# Patient Record
Sex: MALE | Race: WHITE | NOT HISPANIC OR LATINO | Employment: PART TIME | URBAN - METROPOLITAN AREA
[De-identification: names, ages, dates, MRNs, and addresses within clinical notes are randomized per-mention and may not be internally consistent; named-entity substitution may affect disease eponyms.]

---

## 2023-05-26 LAB
ALBUMIN (G/DL) IN SER/PLAS: 4.4 G/DL (ref 3.4–5)
ANION GAP IN SER/PLAS: 11 MMOL/L (ref 10–20)
CALCIUM (MG/DL) IN SER/PLAS: 9.4 MG/DL (ref 8.6–10.3)
CARBON DIOXIDE, TOTAL (MMOL/L) IN SER/PLAS: 28 MMOL/L (ref 21–32)
CHLORIDE (MMOL/L) IN SER/PLAS: 106 MMOL/L (ref 98–107)
COBALAMIN (VITAMIN B12) (PG/ML) IN SER/PLAS: 413 PG/ML (ref 211–911)
CREATININE (MG/DL) IN SER/PLAS: 0.81 MG/DL (ref 0.5–1.3)
FOLATE (NG/ML) IN SER/PLAS: 16.3 NG/ML
GFR MALE: >90 ML/MIN/1.73M2
GLUCOSE (MG/DL) IN SER/PLAS: 92 MG/DL (ref 74–99)
PHOSPHATE (MG/DL) IN SER/PLAS: 2.9 MG/DL (ref 2.5–4.9)
POTASSIUM (MMOL/L) IN SER/PLAS: 4.1 MMOL/L (ref 3.5–5.3)
PROSTATE SPECIFIC AG (NG/ML) IN SER/PLAS: 1.72 NG/ML (ref 0–4)
SODIUM (MMOL/L) IN SER/PLAS: 141 MMOL/L (ref 136–145)
THYROTROPIN (MIU/L) IN SER/PLAS BY DETECTION LIMIT <= 0.05 MIU/L: 3.92 MIU/L (ref 0.44–3.98)
UREA NITROGEN (MG/DL) IN SER/PLAS: 9 MG/DL (ref 6–23)

## 2023-05-29 LAB — VITAMIN D 1,25-DIHYDROXY: 35.1 PG/ML (ref 19.9–79.3)

## 2023-08-25 ENCOUNTER — TELEPHONE (OUTPATIENT)
Dept: PRIMARY CARE | Facility: CLINIC | Age: 73
End: 2023-08-25

## 2023-09-14 ENCOUNTER — TELEPHONE (OUTPATIENT)
Dept: PHARMACY | Facility: HOSPITAL | Age: 73
End: 2023-09-14
Payer: MEDICARE

## 2023-09-14 NOTE — TELEPHONE ENCOUNTER
Population Health: Outreach by Ambulatory Pharmacy Team    Payor: [employer] [place]  Reason: Adherence  Medication: Rosuvastatin 40 mg   Outcome: Left Voicemail    Ying Esquivel, PharmD    09/14/23 at 3:15 PM - Ying Esquivel, PharmD

## 2023-10-02 PROBLEM — M62.08 RECTUS DIASTASIS: Status: ACTIVE | Noted: 2023-10-02

## 2023-10-02 PROBLEM — I65.01 OCCLUSION OF RIGHT VERTEBRAL ARTERY: Status: ACTIVE | Noted: 2023-10-02

## 2023-10-02 PROBLEM — M17.12 ARTHRITIS OF KNEE, LEFT: Status: ACTIVE | Noted: 2023-10-02

## 2023-10-02 PROBLEM — F10.11 HISTORY OF ALCOHOL ABUSE: Status: ACTIVE | Noted: 2023-10-02

## 2023-10-02 PROBLEM — M25.561 ARTHRALGIA OF RIGHT KNEE: Status: ACTIVE | Noted: 2023-10-02

## 2023-10-02 PROBLEM — L30.9 ECZEMA: Status: ACTIVE | Noted: 2023-10-02

## 2023-10-02 PROBLEM — S54.01XS: Status: ACTIVE | Noted: 2023-10-02

## 2023-10-02 PROBLEM — E03.9 HYPOTHYROID: Status: ACTIVE | Noted: 2023-10-02

## 2023-10-02 PROBLEM — I10 BENIGN ESSENTIAL HTN: Status: ACTIVE | Noted: 2023-10-02

## 2023-10-02 PROBLEM — R35.0 URINE FREQUENCY: Status: ACTIVE | Noted: 2023-10-02

## 2023-10-02 PROBLEM — E78.1 HYPERTRIGLYCERIDEMIA: Status: ACTIVE | Noted: 2023-10-02

## 2023-10-02 PROBLEM — Z91.148 NONCOMPLIANCE WITH MEDICATION REGIMEN: Status: ACTIVE | Noted: 2023-10-02

## 2023-10-02 PROBLEM — E78.5 DYSLIPIDEMIA: Status: ACTIVE | Noted: 2023-10-02

## 2023-10-02 PROBLEM — M25.511 CHRONIC RIGHT SHOULDER PAIN: Status: ACTIVE | Noted: 2023-10-02

## 2023-10-02 PROBLEM — I77.9 LEFT-SIDED CAROTID ARTERY DISEASE (CMS-HCC): Status: ACTIVE | Noted: 2023-10-02

## 2023-10-02 PROBLEM — N40.0 BPH (BENIGN PROSTATIC HYPERPLASIA): Status: ACTIVE | Noted: 2023-10-02

## 2023-10-02 PROBLEM — R53.83 FATIGUE: Status: ACTIVE | Noted: 2023-10-02

## 2023-10-02 PROBLEM — F41.9 ANXIETY: Status: ACTIVE | Noted: 2023-10-02

## 2023-10-02 PROBLEM — M75.41 SUBACROMIAL IMPINGEMENT OF RIGHT SHOULDER: Status: ACTIVE | Noted: 2023-10-02

## 2023-10-02 PROBLEM — R10.13 EPIGASTRIC PAIN: Status: ACTIVE | Noted: 2023-10-02

## 2023-10-02 PROBLEM — E63.9 POOR DIET: Status: ACTIVE | Noted: 2023-10-02

## 2023-10-02 PROBLEM — G89.29 CHRONIC RIGHT SHOULDER PAIN: Status: ACTIVE | Noted: 2023-10-02

## 2023-10-02 PROBLEM — M75.81 TENDINITIS OF RIGHT ROTATOR CUFF: Status: ACTIVE | Noted: 2023-10-02

## 2023-10-02 PROBLEM — I10 HYPERTENSION: Status: ACTIVE | Noted: 2023-10-02

## 2023-10-02 PROBLEM — K21.9 GERD (GASTROESOPHAGEAL REFLUX DISEASE): Status: ACTIVE | Noted: 2023-10-02

## 2023-10-02 RX ORDER — PREDNISONE 5 MG/1
3 TABLET ORAL DAILY
COMMUNITY
Start: 2023-05-18 | End: 2023-11-16 | Stop reason: WASHOUT

## 2023-10-02 RX ORDER — FLUTICASONE PROPIONATE 50 MCG
1 SPRAY, SUSPENSION (ML) NASAL NIGHTLY
COMMUNITY
Start: 2023-06-19 | End: 2023-10-12 | Stop reason: WASHOUT

## 2023-10-02 RX ORDER — ATORVASTATIN CALCIUM 40 MG/1
1 TABLET, FILM COATED ORAL DAILY
COMMUNITY
Start: 2020-09-04 | End: 2023-11-16 | Stop reason: WASHOUT

## 2023-10-02 RX ORDER — TRAMADOL HYDROCHLORIDE 50 MG/1
1-2 TABLET ORAL 4 TIMES DAILY PRN
COMMUNITY
End: 2023-10-12 | Stop reason: WASHOUT

## 2023-10-02 RX ORDER — LISINOPRIL 10 MG/1
1 TABLET ORAL DAILY
COMMUNITY
Start: 2022-12-30 | End: 2023-10-12 | Stop reason: WASHOUT

## 2023-10-02 RX ORDER — ROSUVASTATIN CALCIUM 40 MG/1
1 TABLET, COATED ORAL DAILY
COMMUNITY
Start: 2022-12-30 | End: 2024-01-02

## 2023-10-02 RX ORDER — PREGABALIN 75 MG/1
75 CAPSULE ORAL
COMMUNITY
Start: 2022-12-12 | End: 2023-10-12 | Stop reason: WASHOUT

## 2023-10-02 RX ORDER — DOXAZOSIN 2 MG/1
1 TABLET ORAL DAILY
COMMUNITY
Start: 2020-09-04 | End: 2023-10-12 | Stop reason: WASHOUT

## 2023-10-02 RX ORDER — PREDNISONE 10 MG/1
10 TABLET ORAL
COMMUNITY
End: 2023-11-16 | Stop reason: WASHOUT

## 2023-10-02 RX ORDER — LISINOPRIL 20 MG/1
1 TABLET ORAL DAILY
COMMUNITY
Start: 2023-05-26 | End: 2023-11-16 | Stop reason: SDUPTHER

## 2023-10-02 RX ORDER — OMEPRAZOLE 40 MG/1
1 CAPSULE, DELAYED RELEASE ORAL
COMMUNITY
Start: 2022-08-03 | End: 2023-10-12 | Stop reason: WASHOUT

## 2023-10-04 ENCOUNTER — OFFICE VISIT (OUTPATIENT)
Dept: OTOLARYNGOLOGY | Facility: CLINIC | Age: 73
End: 2023-10-04
Payer: MEDICARE

## 2023-10-04 VITALS
SYSTOLIC BLOOD PRESSURE: 202 MMHG | DIASTOLIC BLOOD PRESSURE: 93 MMHG | HEIGHT: 69 IN | TEMPERATURE: 98.3 F | WEIGHT: 175 LBS | BODY MASS INDEX: 25.92 KG/M2 | HEART RATE: 70 BPM

## 2023-10-04 DIAGNOSIS — H61.22 EXCESSIVE CERUMEN IN EAR CANAL, LEFT: ICD-10-CM

## 2023-10-04 DIAGNOSIS — H92.12 OTORRHEA OF LEFT EAR: ICD-10-CM

## 2023-10-04 DIAGNOSIS — H93.13 TINNITUS OF BOTH EARS: ICD-10-CM

## 2023-10-04 DIAGNOSIS — H93.8X2 BLOCKED EAR, LEFT: ICD-10-CM

## 2023-10-04 DIAGNOSIS — H60.392 OTHER INFECTIVE ACUTE OTITIS EXTERNA OF LEFT EAR: Primary | ICD-10-CM

## 2023-10-04 PROCEDURE — 1036F TOBACCO NON-USER: CPT | Performed by: NURSE PRACTITIONER

## 2023-10-04 PROCEDURE — 1159F MED LIST DOCD IN RCRD: CPT | Performed by: NURSE PRACTITIONER

## 2023-10-04 PROCEDURE — 3080F DIAST BP >= 90 MM HG: CPT | Performed by: NURSE PRACTITIONER

## 2023-10-04 PROCEDURE — 99204 OFFICE O/P NEW MOD 45 MIN: CPT | Performed by: NURSE PRACTITIONER

## 2023-10-04 PROCEDURE — 3077F SYST BP >= 140 MM HG: CPT | Performed by: NURSE PRACTITIONER

## 2023-10-04 PROCEDURE — 1125F AMNT PAIN NOTED PAIN PRSNT: CPT | Performed by: NURSE PRACTITIONER

## 2023-10-04 PROCEDURE — 99214 OFFICE O/P EST MOD 30 MIN: CPT | Performed by: NURSE PRACTITIONER

## 2023-10-04 RX ORDER — AMOXICILLIN AND CLAVULANATE POTASSIUM 875; 125 MG/1; MG/1
875 TABLET, FILM COATED ORAL 2 TIMES DAILY
Qty: 14 TABLET | Refills: 0 | Status: SHIPPED | OUTPATIENT
Start: 2023-10-04 | End: 2023-10-12 | Stop reason: WASHOUT

## 2023-10-04 RX ORDER — CIPROFLOXACIN AND DEXAMETHASONE 3; 1 MG/ML; MG/ML
4 SUSPENSION/ DROPS AURICULAR (OTIC) 2 TIMES DAILY
Qty: 2.8 ML | Refills: 0 | Status: SHIPPED | OUTPATIENT
Start: 2023-10-04 | End: 2023-10-11 | Stop reason: SDUPTHER

## 2023-10-04 ASSESSMENT — COLUMBIA-SUICIDE SEVERITY RATING SCALE - C-SSRS
2. HAVE YOU ACTUALLY HAD ANY THOUGHTS OF KILLING YOURSELF?: NO
1. IN THE PAST MONTH, HAVE YOU WISHED YOU WERE DEAD OR WISHED YOU COULD GO TO SLEEP AND NOT WAKE UP?: NO
6. HAVE YOU EVER DONE ANYTHING, STARTED TO DO ANYTHING, OR PREPARED TO DO ANYTHING TO END YOUR LIFE?: NO

## 2023-10-04 ASSESSMENT — ENCOUNTER SYMPTOMS
OCCASIONAL FEELINGS OF UNSTEADINESS: 0
DEPRESSION: 0
LOSS OF SENSATION IN FEET: 0

## 2023-10-04 ASSESSMENT — PATIENT HEALTH QUESTIONNAIRE - PHQ9
2. FEELING DOWN, DEPRESSED OR HOPELESS: NOT AT ALL
1. LITTLE INTEREST OR PLEASURE IN DOING THINGS: NOT AT ALL
SUM OF ALL RESPONSES TO PHQ9 QUESTIONS 1 AND 2: 0

## 2023-10-04 ASSESSMENT — PAIN SCALES - GENERAL: PAINLEVEL: 8

## 2023-10-04 NOTE — PROGRESS NOTES
Subjective   Patient ID: Sandro Florez is a 72 y.o. male who presents for Recurrent Otitis.  HPI  He has a history of left-sided ear aches. He is having pain and fullness. He feels his glands are swollen near the ear. He went to an Urgent Care a couple of months ago for this. He is having hearing loss from the ear. He tries flushing his ear himself. No ear drainage. He has bilateral tinnitus, worse on the left. No dizziness or vertigo. No previous ear surgery. No loud noise exposure. No family history of hearing loss.     Blood pressure is high. No headaches or lightheadedness.  Not currently taking his BP medications. Called pharmacy while here and he is picking it up to start after this.     Past Medical History:   Diagnosis Date    Abnormal findings on diagnostic imaging of other specified body structures     Abnormal carotid ultrasound    Other meniscus derangements, unspecified meniscus, unspecified knee 12/15/2020    Meniscus degeneration    Personal history of other diseases of the nervous system and sense organs 01/30/2015    History of impacted cerumen    Personal history of other infectious and parasitic diseases     History of measles    Personal history of other infectious and parasitic diseases     History of varicella    Personal history of other medical treatment     History of nuclear stress test    Unspecified abdominal hernia without obstruction or gangrene     Hernia      Past Surgical History:   Procedure Laterality Date    KNEE SURGERY  01/30/2015    Knee Surgery    OTHER SURGICAL HISTORY  01/30/2015    Arthroscopy Elbow Right    OTHER SURGICAL HISTORY  12/15/2020    Hydrocele repair    OTHER SURGICAL HISTORY  12/15/2020    Carpal tunnel surgery    OTHER SURGICAL HISTORY  12/15/2020    Extensor tendon repair      Review of Systems    Objective   Physical Exam  Constitutional: No fever, chills, weight loss or weight gain  General appearance: Appears well, well-nourished, well groomed. No acute  distress.    Communication: Normal communication    Psychiatric: Oriented to person, place and time. Normal mood and affect.    Neurologic: Cranial nerves II-XII grossly intact and symmetric bilaterally.    Head and Face:  Head: Atraumatic with no masses, lesions or scarring.  Face: Normal symmetry. No scars or deformities.  TMJ: Normal, no trismus.    Eyes: Conjunctiva not edematous or erythematous. PERRLA    Right Ear: External inspection of ear with no deformity, scars, or masses. EAC is clear.  TM is intact with no sign of infection, effusion, or retraction.  No perforation seen.     Left Ear: External inspection of ear with crusting at the meatus. Edema inferiorly with swollen lymph nodes. EAC is edematous and impacted with cerumen/debris that was partially removed using the microscope and suction.  Patient very tender. Not all debris able to be removed. .  TM is intact with no sign of infection, effusion, or retraction.  No perforation seen.     Nose: External inspection of nose: No nasal lesions, lacerations or scars. Anterior rhinoscopy with limited visualization past the inferior turbinates. No tenderness on frontal or maxillary sinus palpation.    Oral Cavity/Mouth: Oral cavity and oropharynx mucosa moist and pink. No lesions or masses. Dentition normal. Tonsils appear normal. Uvula is midline. Tongue with no masses or lesions. Tongue with good mobility. The oropharynx is clear.    Neck: Normal appearing, symmetric, trachea midline.     Cardiovascular: Examination of peripheral vascular system shows no clubbing or cyanosis.    Respiratory: No respiratory distress increased work of breathing. Inspection of the chest with symmetric chest expansion and normal respiratory effort.    Skin: No head and neck rashes.    Lymph nodes: No adenopathy.     Assessment/Plan       - Start oral Augmentin and Ciprodex drops. Use warm compress to the area. Follow dry ear precautions.  - Follow up in clinic in 1 week.  - If  symptoms worsen, call the office or proceed to the ED. If you develop headaches, dizziness or lightheadedness proceed to the ED as your BP was elevated today (patient states he will restart his medication tonight) and follow up with your PCP regarding this.    All questions answered to patient satisfaction.

## 2023-10-11 ENCOUNTER — TELEPHONE (OUTPATIENT)
Dept: CARDIOLOGY | Facility: CLINIC | Age: 73
End: 2023-10-11

## 2023-10-11 ENCOUNTER — OFFICE VISIT (OUTPATIENT)
Dept: OTOLARYNGOLOGY | Facility: CLINIC | Age: 73
End: 2023-10-11
Payer: MEDICARE

## 2023-10-11 VITALS
SYSTOLIC BLOOD PRESSURE: 203 MMHG | TEMPERATURE: 98.4 F | HEART RATE: 60 BPM | BODY MASS INDEX: 26.7 KG/M2 | WEIGHT: 180.25 LBS | HEIGHT: 69 IN | DIASTOLIC BLOOD PRESSURE: 85 MMHG

## 2023-10-11 DIAGNOSIS — H60.392 OTHER INFECTIVE ACUTE OTITIS EXTERNA OF LEFT EAR: Primary | ICD-10-CM

## 2023-10-11 PROCEDURE — 3079F DIAST BP 80-89 MM HG: CPT | Performed by: NURSE PRACTITIONER

## 2023-10-11 PROCEDURE — 1159F MED LIST DOCD IN RCRD: CPT | Performed by: NURSE PRACTITIONER

## 2023-10-11 PROCEDURE — 1036F TOBACCO NON-USER: CPT | Performed by: NURSE PRACTITIONER

## 2023-10-11 PROCEDURE — 1126F AMNT PAIN NOTED NONE PRSNT: CPT | Performed by: NURSE PRACTITIONER

## 2023-10-11 PROCEDURE — 99213 OFFICE O/P EST LOW 20 MIN: CPT | Performed by: NURSE PRACTITIONER

## 2023-10-11 PROCEDURE — 3077F SYST BP >= 140 MM HG: CPT | Performed by: NURSE PRACTITIONER

## 2023-10-11 RX ORDER — CIPROFLOXACIN AND DEXAMETHASONE 3; 1 MG/ML; MG/ML
4 SUSPENSION/ DROPS AURICULAR (OTIC) 2 TIMES DAILY
Qty: 2.8 ML | Refills: 0 | Status: SHIPPED | OUTPATIENT
Start: 2023-10-11 | End: 2023-10-12 | Stop reason: WASHOUT

## 2023-10-11 ASSESSMENT — PATIENT HEALTH QUESTIONNAIRE - PHQ9
SUM OF ALL RESPONSES TO PHQ9 QUESTIONS 1 AND 2: 0
2. FEELING DOWN, DEPRESSED OR HOPELESS: NOT AT ALL
1. LITTLE INTEREST OR PLEASURE IN DOING THINGS: NOT AT ALL

## 2023-10-11 ASSESSMENT — ENCOUNTER SYMPTOMS
DEPRESSION: 0
OCCASIONAL FEELINGS OF UNSTEADINESS: 0
LOSS OF SENSATION IN FEET: 0

## 2023-10-11 ASSESSMENT — COLUMBIA-SUICIDE SEVERITY RATING SCALE - C-SSRS
1. IN THE PAST MONTH, HAVE YOU WISHED YOU WERE DEAD OR WISHED YOU COULD GO TO SLEEP AND NOT WAKE UP?: NO
2. HAVE YOU ACTUALLY HAD ANY THOUGHTS OF KILLING YOURSELF?: NO
6. HAVE YOU EVER DONE ANYTHING, STARTED TO DO ANYTHING, OR PREPARED TO DO ANYTHING TO END YOUR LIFE?: NO

## 2023-10-11 ASSESSMENT — PAIN SCALES - GENERAL: PAINLEVEL: 0-NO PAIN

## 2023-10-11 NOTE — PROGRESS NOTES
Subjective   Patient ID: Sandro Florez is a 72 y.o. male who presents for Recurrent Otitis.    HPI  INITIAL VISIT 10/4/2023:  He has a history of left-sided ear aches. He is having pain and fullness. He feels his glands are swollen near the ear. He went to an Urgent Care a couple of months ago for this. He is having hearing loss from the ear. He tries flushing his ear himself. No ear drainage. He has bilateral tinnitus, worse on the left. No dizziness or vertigo. No previous ear surgery. No loud noise exposure. No family history of hearing loss.     Blood pressure is high. No headaches or lightheadedness.  Not currently taking his BP medications. Called pharmacy while here and he is picking it up to start after this.     10/11/2023: Patient following up for his left ear. Has been taking oral antibiotic and ear drops. No more pain. Still can't hear, it is clogged. Sometimes it will pop and he can hear. He is still having tinnitus. No drainage. No dizziness or headaches. Overall much better.     Past Medical History:   Diagnosis Date    Abnormal findings on diagnostic imaging of other specified body structures     Abnormal carotid ultrasound    Other meniscus derangements, unspecified meniscus, unspecified knee 12/15/2020    Meniscus degeneration    Personal history of other diseases of the nervous system and sense organs 01/30/2015    History of impacted cerumen    Personal history of other infectious and parasitic diseases     History of measles    Personal history of other infectious and parasitic diseases     History of varicella    Personal history of other medical treatment     History of nuclear stress test    Unspecified abdominal hernia without obstruction or gangrene     Hernia      Past Surgical History:   Procedure Laterality Date    KNEE SURGERY  01/30/2015    Knee Surgery    OTHER SURGICAL HISTORY  01/30/2015    Arthroscopy Elbow Right    OTHER SURGICAL HISTORY  12/15/2020    Hydrocele repair    OTHER  SURGICAL HISTORY  12/15/2020    Carpal tunnel surgery    OTHER SURGICAL HISTORY  12/15/2020    Extensor tendon repair      Review of Systems    Objective   Physical Exam    Right Ear: External inspection of ear with no deformity, scars, or masses. EAC is clear.  TM is intact with no sign of infection, effusion, or retraction.  No perforation seen.     Left Ear: External inspection of ear with mild crusting at the meatus. Mild edema inferiorly with swollen lymph nodes, much improved. EAC is slightly edematous and impacted with cerumen/debris that was completely removed using the microscope and suction.  No tenderness on manipulation/ TM is intact with no sign of infection, effusion, or retraction.  No perforation seen.     Assessment/Plan   - Continue Ciprodex drops for the next 5 days. I sent a refill to your pharmacy.   - Keep ear dry.  - He will follow up in 2 weeks.  - I will contact your cardiologist regarding your blood pressure, patient will also call today. If he develops headache, dizziness or blurry vision proceed to the ED.    All questions answered.

## 2023-10-12 ENCOUNTER — OFFICE VISIT (OUTPATIENT)
Dept: CARDIOLOGY | Facility: CLINIC | Age: 73
End: 2023-10-12
Payer: MEDICARE

## 2023-10-12 VITALS
BODY MASS INDEX: 26.29 KG/M2 | HEART RATE: 63 BPM | DIASTOLIC BLOOD PRESSURE: 88 MMHG | WEIGHT: 178 LBS | SYSTOLIC BLOOD PRESSURE: 182 MMHG | OXYGEN SATURATION: 97 %

## 2023-10-12 DIAGNOSIS — I10 UNCONTROLLED HYPERTENSION: Primary | ICD-10-CM

## 2023-10-12 DIAGNOSIS — I65.22 STENOSIS OF LEFT CAROTID ARTERY: ICD-10-CM

## 2023-10-12 PROCEDURE — 1036F TOBACCO NON-USER: CPT | Performed by: PHYSICIAN ASSISTANT

## 2023-10-12 PROCEDURE — 3077F SYST BP >= 140 MM HG: CPT | Performed by: PHYSICIAN ASSISTANT

## 2023-10-12 PROCEDURE — 1159F MED LIST DOCD IN RCRD: CPT | Performed by: PHYSICIAN ASSISTANT

## 2023-10-12 PROCEDURE — 99214 OFFICE O/P EST MOD 30 MIN: CPT | Performed by: PHYSICIAN ASSISTANT

## 2023-10-12 PROCEDURE — 3079F DIAST BP 80-89 MM HG: CPT | Performed by: PHYSICIAN ASSISTANT

## 2023-10-12 PROCEDURE — 1126F AMNT PAIN NOTED NONE PRSNT: CPT | Performed by: PHYSICIAN ASSISTANT

## 2023-10-12 RX ORDER — DOXAZOSIN 2 MG/1
2 TABLET ORAL NIGHTLY
Qty: 30 TABLET | Refills: 11 | Status: SHIPPED | OUTPATIENT
Start: 2023-10-12 | End: 2023-12-19 | Stop reason: SDUPTHER

## 2023-10-12 NOTE — PROGRESS NOTES
Chief Complaint:   Uncontrolled HTN, carotid artery disease     History Of Present Illness:    Sandro Florez is a 73 y.o. male presenting with uncontrolled HTN.  Patient called our triage nurse line yesterday with reports of SBP readings >200 mmHg despite treatment with lisinopril 20mg every day.  At a previous visit with Dr. Fitzpatrick he was noted to be hypertensive, and as a result doxazosin was ordered - unfortunately patient never filled this medication.  He is asymptomatic despite markedly elevated SBP, denies dizziness, visual changes, headaches, epistaxis, chest pain.  Patient does have known LICA disease which was documented on carotid duplex U/S 1 year ago.  He was scheduled for repeat study more recently however due to complications with his work schedule this test was cancelled.  No TIA/CVA symptoms.  Patient is otherwise resting at his functional baseline.  Patient denies chest pain, chest pressure, palpitations, dyspnea on exertion, shortness of breath at rest, diaphoresis, nausea/vomiting, back pain, headache, lightheadedness, dizziness, syncope or presyncopal episodes, active bleeding signs or symptoms, excessive weight gain, muscle or joint pain, claudication.     Last Recorded Vitals:  Vitals:    10/12/23 1156   BP: (!) 182/88   BP Location: Left arm   Patient Position: Sitting   Pulse: 63   SpO2: 97%   Weight: 80.7 kg (178 lb)       Past Medical History:  He has a past medical history of Abnormal findings on diagnostic imaging of other specified body structures, Other meniscus derangements, unspecified meniscus, unspecified knee (12/15/2020), Personal history of other diseases of the nervous system and sense organs (01/30/2015), Personal history of other infectious and parasitic diseases, Personal history of other infectious and parasitic diseases, Personal history of other medical treatment, and Unspecified abdominal hernia without obstruction or gangrene.    Past Surgical History:  He has a past  surgical history that includes Knee surgery (01/30/2015); Other surgical history (01/30/2015); Other surgical history (12/15/2020); Other surgical history (12/15/2020); and Other surgical history (12/15/2020).      Social History:  He reports that he has quit smoking. His smoking use included cigarettes. He has never used smokeless tobacco. He reports current alcohol use of about 4.0 standard drinks of alcohol per week. He reports that he does not use drugs.    Family History:  Family History   Problem Relation Name Age of Onset    No Known Problems Mother      No Known Problems Father          Allergies:  Patient has no known allergies.    Outpatient Medications:  Current Outpatient Medications   Medication Instructions    atorvastatin (Lipitor) 40 mg tablet 1 tablet, oral, Daily    lisinopril 20 mg tablet 1 tablet, oral, Daily    predniSONE (Deltasone) 5 mg tablet 3 tablets, oral, Daily    predniSONE 10 mg, oral    rosuvastatin (Crestor) 40 mg tablet 1 tablet, oral, Daily       Physical Exam:  Constitutional: awake and alert, oriented ×3, no apparent distress  Skin: warm, dry, good turgor no obvious lesions  Eyes: pupils equal, round, reactive to light, conjunctiva pink and noninjected, no discharge  HENT: normocephalic and atraumatic, mucous membranes moist, trachea midline with no masses/goiter  Cardiovascular: S1/S2 regular, no murmur no rubs/gallops, no carotid bruits, no JVD  Pulmonary: symmetrical chest expansion, lungs are clear to auscultation bilaterally, no wheezes/rales/rhonchi, normal effort  Abdomen: nontender, nondistended, active bowel sounds, no ascites  Extremities: no cyanosis, clubbing, no LE edema no lesions; palpable pedal pulses  Neurologic: cranial nerves II - XII grossly intact, stable gait, no tremor       Last Labs:  CBC -  Lab Results   Component Value Date    WBC 4.7 08/12/2022    HGB 15.1 08/12/2022    HCT 43.7 08/12/2022    MCV 95 08/12/2022     08/12/2022       CMP -  Lab  Results   Component Value Date    CALCIUM 9.4 05/26/2023    PHOS 2.9 05/26/2023    PROT 6.9 08/12/2022    ALBUMIN 4.4 05/26/2023    AST 17 08/12/2022    ALT 11 08/12/2022    ALKPHOS 47 08/12/2022    BILITOT 0.9 08/12/2022       LIPID PANEL -   Lab Results   Component Value Date    CHOL 174 09/30/2022    TRIG 111 09/30/2022    HDL 51.3 09/30/2022    CHHDL 3.4 09/30/2022    LDLF 101 (H) 09/30/2022    VLDL 22 09/30/2022    NHDL 221 08/12/2022       RENAL FUNCTION PANEL -   Lab Results   Component Value Date    GLUCOSE 92 05/26/2023     05/26/2023    K 4.1 05/26/2023     05/26/2023    CO2 28 05/26/2023    ANIONGAP 11 05/26/2023    BUN 9 05/26/2023    CREATININE 0.81 05/26/2023    GFRMALE >90 05/26/2023    CALCIUM 9.4 05/26/2023    PHOS 2.9 05/26/2023    ALBUMIN 4.4 05/26/2023        Lab Results   Component Value Date    BNP 14 11/15/2018       Last Cardiology Tests:  ECG:  No results found for this or any previous visit from the past 1095 days.      Echo:  No results found for this or any previous visit from the past 1095 days.      Ejection Fractions:    Cath:  No results found for this or any previous visit from the past 1095 days.      Stress Test:  11/17/22  1. Normal stress myocardial perfusion imaging in response to  pharmacologic stress.  2. Well-maintained left ventricular function.  52 %    Cardiac Imaging:  10/27/22 - Carotid duplex U/S  Right Carotid: Findings are consistent with less than 50% stenosis of the right proximal ICA. Laminar flow seen by color Doppler. There are elevated velocities in the right ECA that are suggestive of disease. No evidence of hemodynamically significant stenosis of the right common carotid artery. The right vertebral artery demonstrates no flow. No evidence of hemodynamically significant stenosis in the right subclavian.  Left Carotid: Findings are consistent with greater than 70% stenosis of the left proximal ICA. Turbulent flow seen by color Doppler. Left external  carotid artery appears patent with no evidence of stenosis. No evidence of hemodynamically significant stenosis of the left common carotid artery. The left vertebral artery is patent with antegrade flow. No evidence of hemodynamically significant stenosis in the left subclavian.          Assessment/Plan   Diagnoses and all orders for this visit:  Uncontrolled hypertension  -Pressures remain markedly elevated with monotherapy, therefore add:  -     doxazosin (Cardura) 2 mg tablet; Take 1 tablet (2 mg) by mouth once daily at bedtime.  -Monitor home BP 2-3 days per week and notify our office with consistent SBP>160 mmHg  Stenosis of left carotid artery  -Known >70% LICA disease therefore repeat:  -     Vascular US carotid artery duplex bilateral; Future    F/U Dr. Fitzpatrick in 2-3 months    JAILENE WrightC

## 2023-10-12 NOTE — TELEPHONE ENCOUNTER
Pt called 10/11 stating his BP has been elevated (). He had a doctor's appt today and he was instructed to notify Dr Fitzpatrick. I reviewed with Dr Fitzpatrick and she would like him to see SWATI Mcgovern. Pt has an OV scheduled, but not until next month.     Pt was scheduled to see Froilan on 10/12, patient accepted and given appt details.

## 2023-11-08 ENCOUNTER — OFFICE VISIT (OUTPATIENT)
Dept: OTOLARYNGOLOGY | Facility: CLINIC | Age: 73
End: 2023-11-08
Payer: MEDICARE

## 2023-11-08 VITALS
TEMPERATURE: 99 F | RESPIRATION RATE: 16 BRPM | HEART RATE: 63 BPM | BODY MASS INDEX: 27.01 KG/M2 | HEIGHT: 69 IN | DIASTOLIC BLOOD PRESSURE: 82 MMHG | SYSTOLIC BLOOD PRESSURE: 199 MMHG | WEIGHT: 182.38 LBS

## 2023-11-08 DIAGNOSIS — H92.12 OTORRHEA OF LEFT EAR: ICD-10-CM

## 2023-11-08 DIAGNOSIS — H93.8X2 SENSATION OF PLUGGED EAR ON LEFT SIDE: ICD-10-CM

## 2023-11-08 DIAGNOSIS — H92.02 LEFT EAR PAIN: Primary | ICD-10-CM

## 2023-11-08 PROCEDURE — 87070 CULTURE OTHR SPECIMN AEROBIC: CPT | Performed by: NURSE PRACTITIONER

## 2023-11-08 PROCEDURE — 3077F SYST BP >= 140 MM HG: CPT | Performed by: NURSE PRACTITIONER

## 2023-11-08 PROCEDURE — 3079F DIAST BP 80-89 MM HG: CPT | Performed by: NURSE PRACTITIONER

## 2023-11-08 PROCEDURE — 87102 FUNGUS ISOLATION CULTURE: CPT | Performed by: NURSE PRACTITIONER

## 2023-11-08 PROCEDURE — 1036F TOBACCO NON-USER: CPT | Performed by: NURSE PRACTITIONER

## 2023-11-08 PROCEDURE — 99213 OFFICE O/P EST LOW 20 MIN: CPT | Performed by: NURSE PRACTITIONER

## 2023-11-08 PROCEDURE — 1159F MED LIST DOCD IN RCRD: CPT | Performed by: NURSE PRACTITIONER

## 2023-11-08 PROCEDURE — 1125F AMNT PAIN NOTED PAIN PRSNT: CPT | Performed by: NURSE PRACTITIONER

## 2023-11-08 RX ORDER — MELOXICAM 15 MG/1
15 TABLET ORAL DAILY
COMMUNITY
Start: 2023-11-02 | End: 2024-03-15 | Stop reason: ALTCHOICE

## 2023-11-08 RX ORDER — CLOTRIMAZOLE 1 G/ML
SOLUTION TOPICAL
Qty: 15 ML | Refills: 0 | Status: SHIPPED | OUTPATIENT
Start: 2023-11-08 | End: 2023-11-16 | Stop reason: WASHOUT

## 2023-11-08 RX ORDER — MUPIROCIN 20 MG/G
OINTMENT TOPICAL
Qty: 30 G | Refills: 0 | Status: SHIPPED | OUTPATIENT
Start: 2023-11-08 | End: 2023-11-16 | Stop reason: WASHOUT

## 2023-11-08 SDOH — ECONOMIC STABILITY: FOOD INSECURITY: WITHIN THE PAST 12 MONTHS, THE FOOD YOU BOUGHT JUST DIDN'T LAST AND YOU DIDN'T HAVE MONEY TO GET MORE.: NEVER TRUE

## 2023-11-08 SDOH — ECONOMIC STABILITY: HOUSING INSECURITY
IN THE LAST 12 MONTHS, WAS THERE A TIME WHEN YOU DID NOT HAVE A STEADY PLACE TO SLEEP OR SLEPT IN A SHELTER (INCLUDING NOW)?: NO

## 2023-11-08 SDOH — ECONOMIC STABILITY: INCOME INSECURITY: IN THE LAST 12 MONTHS, WAS THERE A TIME WHEN YOU WERE NOT ABLE TO PAY THE MORTGAGE OR RENT ON TIME?: NO

## 2023-11-08 ASSESSMENT — ENCOUNTER SYMPTOMS
DEPRESSION: 0
OCCASIONAL FEELINGS OF UNSTEADINESS: 0
LOSS OF SENSATION IN FEET: 0

## 2023-11-08 ASSESSMENT — PAIN SCALES - GENERAL: PAINLEVEL: 7

## 2023-11-08 ASSESSMENT — COLUMBIA-SUICIDE SEVERITY RATING SCALE - C-SSRS
6. HAVE YOU EVER DONE ANYTHING, STARTED TO DO ANYTHING, OR PREPARED TO DO ANYTHING TO END YOUR LIFE?: NO
1. IN THE PAST MONTH, HAVE YOU WISHED YOU WERE DEAD OR WISHED YOU COULD GO TO SLEEP AND NOT WAKE UP?: NO
2. HAVE YOU ACTUALLY HAD ANY THOUGHTS OF KILLING YOURSELF?: NO

## 2023-11-08 NOTE — PROGRESS NOTES
Subjective   Patient ID: Sandro Florez is a 72 y.o. male who presents for Recurrent Otitis.    HPI  INITIAL VISIT 10/4/2023:  He has a history of left-sided ear aches. He is having pain and fullness. He feels his glands are swollen near the ear. He went to an Urgent Care a couple of months ago for this. He is having hearing loss from the ear. He tries flushing his ear himself. No ear drainage. He has bilateral tinnitus, worse on the left. No dizziness or vertigo. No previous ear surgery. No loud noise exposure. No family history of hearing loss.     Blood pressure is high. No headaches or lightheadedness.  Not currently taking his BP medications. Called pharmacy while here and he is picking it up to start after this.     10/11/2023: Patient following up for his left ear. Has been taking oral antibiotic and ear drops. No more pain. Still can't hear, it is clogged. Sometimes it will pop and he can hear. He is still having tinnitus. No drainage. No dizziness or headaches. Overall much better.     11/8/2023: Patient following up for his left ear. Ear is clogged. He is having pain in the ear. He is having drainage from the ear.     Past Medical History:   Diagnosis Date    Abnormal findings on diagnostic imaging of other specified body structures     Abnormal carotid ultrasound    Other meniscus derangements, unspecified meniscus, unspecified knee 12/15/2020    Meniscus degeneration    Personal history of other diseases of the nervous system and sense organs 01/30/2015    History of impacted cerumen    Personal history of other infectious and parasitic diseases     History of measles    Personal history of other infectious and parasitic diseases     History of varicella    Personal history of other medical treatment     History of nuclear stress test    Unspecified abdominal hernia without obstruction or gangrene     Hernia      Past Surgical History:   Procedure Laterality Date    KNEE SURGERY  01/30/2015    Knee  Surgery    OTHER SURGICAL HISTORY  01/30/2015    Arthroscopy Elbow Right    OTHER SURGICAL HISTORY  12/15/2020    Hydrocele repair    OTHER SURGICAL HISTORY  12/15/2020    Carpal tunnel surgery    OTHER SURGICAL HISTORY  12/15/2020    Extensor tendon repair      Review of Systems    Objective   Physical Exam    Right Ear: External inspection of ear with no deformity, scars, or masses. EAC is clear.  TM is intact with no sign of infection, effusion, or retraction.  No perforation seen.     Left Ear: External inspection of ear with mild crusting at the meatus. Mild edema inferiorly with swollen lymph nodes. EAC is edematous and impacted with fungal appearing debris. More medially it appears like cerumen/dry drainage, but the TM appears intact but inflamed.  A culture was taken.     Assessment/Plan   - Start Clotrimazole drops for the next 14 days.  - Start Mupirocin ointment on the external ear for the next 7 days.  - Follow dry ear precautions.  - Patient will follow up next week.  Call the office with new or worsening symptoms.     All questions answered to patient satisfaction.

## 2023-11-09 ENCOUNTER — HOSPITAL ENCOUNTER (OUTPATIENT)
Dept: VASCULAR MEDICINE | Facility: CLINIC | Age: 73
Discharge: HOME | End: 2023-11-09
Payer: MEDICARE

## 2023-11-09 DIAGNOSIS — I65.23 OCCLUSION AND STENOSIS OF BILATERAL CAROTID ARTERIES: ICD-10-CM

## 2023-11-09 DIAGNOSIS — I65.22 STENOSIS OF LEFT CAROTID ARTERY: ICD-10-CM

## 2023-11-09 PROCEDURE — 93880 EXTRACRANIAL BILAT STUDY: CPT

## 2023-11-09 PROCEDURE — 93880 EXTRACRANIAL BILAT STUDY: CPT | Performed by: SURGERY

## 2023-11-14 LAB
FUNGUS SPEC CULT: NORMAL
FUNGUS SPEC FUNGUS STN: NORMAL

## 2023-11-15 PROBLEM — F41.1 GENERALIZED ANXIETY DISORDER: Status: ACTIVE | Noted: 2017-06-09

## 2023-11-15 PROBLEM — F33.1 MAJOR DEPRESSIVE DISORDER, RECURRENT EPISODE, MODERATE (MULTI): Status: ACTIVE | Noted: 2017-06-10

## 2023-11-16 ENCOUNTER — OFFICE VISIT (OUTPATIENT)
Dept: CARDIOLOGY | Facility: CLINIC | Age: 73
End: 2023-11-16
Payer: MEDICARE

## 2023-11-16 ENCOUNTER — OFFICE VISIT (OUTPATIENT)
Dept: OTOLARYNGOLOGY | Facility: CLINIC | Age: 73
End: 2023-11-16
Payer: MEDICARE

## 2023-11-16 VITALS
HEART RATE: 64 BPM | SYSTOLIC BLOOD PRESSURE: 156 MMHG | WEIGHT: 179.2 LBS | BODY MASS INDEX: 26.54 KG/M2 | HEIGHT: 69 IN | DIASTOLIC BLOOD PRESSURE: 88 MMHG | OXYGEN SATURATION: 96 %

## 2023-11-16 VITALS
HEART RATE: 60 BPM | HEIGHT: 69 IN | DIASTOLIC BLOOD PRESSURE: 88 MMHG | RESPIRATION RATE: 18 BRPM | SYSTOLIC BLOOD PRESSURE: 156 MMHG | OXYGEN SATURATION: 99 % | WEIGHT: 179 LBS | BODY MASS INDEX: 26.51 KG/M2

## 2023-11-16 DIAGNOSIS — H62.42 OTOMYCOSIS OF LEFT EAR: Primary | ICD-10-CM

## 2023-11-16 DIAGNOSIS — I65.22 STENOSIS OF LEFT CAROTID ARTERY: Primary | ICD-10-CM

## 2023-11-16 DIAGNOSIS — I10 BENIGN ESSENTIAL HTN: ICD-10-CM

## 2023-11-16 DIAGNOSIS — B36.9 OTOMYCOSIS OF LEFT EAR: Primary | ICD-10-CM

## 2023-11-16 PROCEDURE — 1125F AMNT PAIN NOTED PAIN PRSNT: CPT | Performed by: NURSE PRACTITIONER

## 2023-11-16 PROCEDURE — 3079F DIAST BP 80-89 MM HG: CPT | Performed by: NURSE PRACTITIONER

## 2023-11-16 PROCEDURE — 99212 OFFICE O/P EST SF 10 MIN: CPT | Performed by: NURSE PRACTITIONER

## 2023-11-16 PROCEDURE — 1159F MED LIST DOCD IN RCRD: CPT | Performed by: INTERNAL MEDICINE

## 2023-11-16 PROCEDURE — 3077F SYST BP >= 140 MM HG: CPT | Performed by: INTERNAL MEDICINE

## 2023-11-16 PROCEDURE — 1036F TOBACCO NON-USER: CPT | Performed by: INTERNAL MEDICINE

## 2023-11-16 PROCEDURE — 99214 OFFICE O/P EST MOD 30 MIN: CPT | Performed by: INTERNAL MEDICINE

## 2023-11-16 PROCEDURE — 1159F MED LIST DOCD IN RCRD: CPT | Performed by: NURSE PRACTITIONER

## 2023-11-16 PROCEDURE — 1125F AMNT PAIN NOTED PAIN PRSNT: CPT | Performed by: INTERNAL MEDICINE

## 2023-11-16 PROCEDURE — 1036F TOBACCO NON-USER: CPT | Performed by: NURSE PRACTITIONER

## 2023-11-16 PROCEDURE — 3077F SYST BP >= 140 MM HG: CPT | Performed by: NURSE PRACTITIONER

## 2023-11-16 PROCEDURE — 3079F DIAST BP 80-89 MM HG: CPT | Performed by: INTERNAL MEDICINE

## 2023-11-16 RX ORDER — LISINOPRIL 20 MG/1
40 TABLET ORAL DAILY
Qty: 180 TABLET | Refills: 3 | Status: SHIPPED | OUTPATIENT
Start: 2023-11-16 | End: 2024-02-22 | Stop reason: SDUPTHER

## 2023-11-16 RX ORDER — CLOTRIMAZOLE 1 G/ML
SOLUTION TOPICAL
Qty: 30 ML | Refills: 0 | Status: SHIPPED | OUTPATIENT
Start: 2023-11-16 | End: 2023-12-19 | Stop reason: ALTCHOICE

## 2023-11-16 RX ORDER — BENZONATATE 100 MG/1
CAPSULE ORAL
COMMUNITY
Start: 2023-11-09 | End: 2023-12-19 | Stop reason: ALTCHOICE

## 2023-11-16 NOTE — PROGRESS NOTES
PCP: none  Cardiologist: Milan Marie   Sandro Florez is a 73 y.o. male who is here for follow up of  carotid artery disease (asymptomatic) .  Since last visit, denies any new symptoms.  States he is tired all the time. Has OA and significant pain.     Review of Systems:  Otherwise, limited cardiovascular review of systems is negative.    Past Medical History:  He has a past medical history of Abnormal findings on diagnostic imaging of other specified body structures, Other meniscus derangements, unspecified meniscus, unspecified knee (12/15/2020), Personal history of other diseases of the nervous system and sense organs (01/30/2015), Personal history of other infectious and parasitic diseases, Personal history of other infectious and parasitic diseases, Personal history of other medical treatment, and Unspecified abdominal hernia without obstruction or gangrene.    Surgical History:   He has a past surgical history that includes Knee surgery (01/30/2015); Other surgical history (01/30/2015); Other surgical history (12/15/2020); Other surgical history (12/15/2020); and Other surgical history (12/15/2020).    Family History:   Family History   Problem Relation Name Age of Onset    No Known Problems Mother      No Known Problems Father       Family History   Problem Relation Name Age of Onset    No Known Problems Mother      No Known Problems Father         Social History:   Tobacco Use: Medium Risk (11/8/2023)    Patient History     Smoking Tobacco Use: Former     Smokeless Tobacco Use: Never     Passive Exposure: Not on file       Outpatient Medications:    Current Outpatient Medications:     doxazosin (Cardura) 2 mg tablet, Take 1 tablet (2 mg) by mouth once daily at bedtime., Disp: 30 tablet, Rfl: 11    lisinopril 20 mg tablet, Take 1 tablet (20 mg) by mouth once daily., Disp: , Rfl:     meloxicam (Mobic) 15 mg tablet, Take 1 tablet (15 mg) by mouth once daily., Disp: , Rfl:     rosuvastatin (Crestor) 40  "mg tablet, Take 1 tablet (40 mg) by mouth once daily., Disp: , Rfl:      Allergies:  Patient has no known allergies.       Objective   Vital Signs:  /88 (BP Location: Left arm, Patient Position: Sitting, BP Cuff Size: Adult)   Pulse 64   Ht 1.753 m (5' 9\")   Wt 81.3 kg (179 lb 3.2 oz)   SpO2 96%   BMI 26.46 kg/m²     Physical Exam:  General: no acute distress  HEENT: EOMI, no scleral icterus.  Lungs: Clear to auscultation bilaterally without wheezing, rales, or rhonchi.  Cardiovascular: Regular rhythm and rate. Normal S1 and S2. No murmurs, rubs, or gallops are appreciated. JVP normal.  left carotid bruit  Abdomen: Soft, nontender, nondistended. Bowel sounds present.  Extremities: Warm and well perfused with equal 2+ pulses bilaterally.  No edema present.  Neurologic: Alert and oriented x3.    Pertinent Recent Cardiovascular Studies (personally reviewed):  Vascular studies:  Carotid duplex 11/9/23: velocities slightly higher on L ICA compared to 2022.     Laboratory values:  CMP:  Recent Labs     05/26/23  0921 08/12/22  1054 11/15/18  1110 11/15/18  1056    142  --  132*   K 4.1 4.2  --  3.9    108*  --  103   CO2 28 26  --  27   ANIONGAP 11 12  --  6*   BUN 9 11  --  13   CREATININE 0.81 0.90  --  0.94   MG  --   --  2.13  --      Recent Labs     05/26/23  0921 08/12/22  1054 11/15/18  1056   ALBUMIN 4.4 4.3 4.5   ALKPHOS  --  47 60   ALT  --  11 24   AST  --  17 26   BILITOT  --  0.9 0.8     CBC:  Recent Labs     08/12/22  1054 11/15/18  1056   WBC 4.7 5.7   HGB 15.1 17.2   HCT 43.7 49.3    275   MCV 95 93     COAG:   Recent Labs     11/15/18  1110   INR 1.1     ABO: No results for input(s): \"ABO\" in the last 84289 hours.  HEME/ENDO:  Recent Labs     05/26/23  0921 08/12/22  1054 11/15/18  1110   TSH 3.92 3.26 5.68*      CARDIAC:   Recent Labs     11/15/18  1110   BNP 14     Recent Labs     09/30/22  0647 08/12/22  1054   CHOL 174 261*   LDLF 101* 166*   HDL 51.3 39.6*   TRIG 111 " "275*     MICRO: No results for input(s): \"ESR\", \"CRP\", \"PROCAL\" in the last 58124 hours.  Susceptibility data from last 90 days.  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      Assessment/Plan   73 y.o. male with asymptomatic carotid artery disease in the background of dyslipidemia and hypertension.    Significant BLE knee pain, hip pain. Management of arthritis with rheumatology. Generalized fatigue complaints.    Plan:  - will continue to monitor L carotid - remains asymptomatic currently  - reviewed sxs of stroke to look for  - ?statin intolerance - will trial 2 week off; our office will call him to see if improvement at 2 weeks  - increase lisinopril for goal BP <130/80 mmHg  - follow up 6 months endovascular clinic  - PCP referral         SIGNATURE: Marco Fitzpatrick MD PATIENT NAME: Sandro Florez   DATE/TIME: November 16, 2023 9:38 AM MRN: [id number]     "

## 2023-11-16 NOTE — PROGRESS NOTES
Subjective   Patient ID: Snadro Florez is a 72 y.o. male who presents for Recurrent Otitis.    HPI  INITIAL VISIT 10/4/2023:  He has a history of left-sided ear aches. He is having pain and fullness. He feels his glands are swollen near the ear. He went to an Urgent Care a couple of months ago for this. He is having hearing loss from the ear. He tries flushing his ear himself. No ear drainage. He has bilateral tinnitus, worse on the left. No dizziness or vertigo. No previous ear surgery. No loud noise exposure. No family history of hearing loss.     Blood pressure is high. No headaches or lightheadedness.  Not currently taking his BP medications. Called pharmacy while here and he is picking it up to start after this.     10/11/2023: Patient following up for his left ear. Has been taking oral antibiotic and ear drops. No more pain. Still can't hear, it is clogged. Sometimes it will pop and he can hear. He is still having tinnitus. No drainage. No dizziness or headaches. Overall much better.     11/8/2023: Patient following up for his left ear. Ear is clogged. He is having pain in the ear. He is having drainage from the ear.     11/16/2023: Patient following up for his right ear. It is improving, no pain. It is still itching and clogged. He has been using the Clotrimazole drops. States his hearing is at about 50%.     Past Medical History:   Diagnosis Date    Abnormal findings on diagnostic imaging of other specified body structures     Abnormal carotid ultrasound    Other meniscus derangements, unspecified meniscus, unspecified knee 12/15/2020    Meniscus degeneration    Personal history of other diseases of the nervous system and sense organs 01/30/2015    History of impacted cerumen    Personal history of other infectious and parasitic diseases     History of measles    Personal history of other infectious and parasitic diseases     History of varicella    Personal history of other medical treatment     History  of nuclear stress test    Unspecified abdominal hernia without obstruction or gangrene     Hernia      Past Surgical History:   Procedure Laterality Date    KNEE SURGERY  01/30/2015    Knee Surgery    OTHER SURGICAL HISTORY  01/30/2015    Arthroscopy Elbow Right    OTHER SURGICAL HISTORY  12/15/2020    Hydrocele repair    OTHER SURGICAL HISTORY  12/15/2020    Carpal tunnel surgery    OTHER SURGICAL HISTORY  12/15/2020    Extensor tendon repair      Review of Systems    Objective   Physical Exam    Right Ear: External inspection of ear with no deformity, scars, or masses. EAC is clear.  TM is intact with no sign of infection, effusion, or retraction.  No perforation seen.     Left Ear: External inspection of ear is normal. EAC is edematous and wet. Mild debris was removed using the microscope and suction. Much improved. The TM appears intact but inflamed.    Diagnostic Results     Culture shows Aspergillus fumigatus.     Assessment/Plan   - Continue Clotrimazole. I refilled these in case he runs out.   - Would like to see patient back next week, but he works next week so will follow up at the end of November. I advised him to call the office ASAP if symptoms begin to worsen.      All questions answered to patient satisfaction.

## 2023-11-17 LAB
BACTERIA SPEC CULT: ABNORMAL
BACTERIA SPEC CULT: ABNORMAL
GRAM STN SPEC: ABNORMAL
GRAM STN SPEC: ABNORMAL

## 2023-12-11 ENCOUNTER — OFFICE VISIT (OUTPATIENT)
Dept: ORTHOPEDIC SURGERY | Facility: CLINIC | Age: 73
End: 2023-12-11
Payer: MEDICARE

## 2023-12-11 DIAGNOSIS — M17.12 ARTHRITIS OF LEFT KNEE: Primary | ICD-10-CM

## 2023-12-11 DIAGNOSIS — Z01.818 PREOP GENERAL PHYSICAL EXAM: ICD-10-CM

## 2023-12-11 PROCEDURE — 1036F TOBACCO NON-USER: CPT | Performed by: ORTHOPAEDIC SURGERY

## 2023-12-11 PROCEDURE — 99213 OFFICE O/P EST LOW 20 MIN: CPT | Performed by: ORTHOPAEDIC SURGERY

## 2023-12-11 PROCEDURE — 1125F AMNT PAIN NOTED PAIN PRSNT: CPT | Performed by: ORTHOPAEDIC SURGERY

## 2023-12-11 PROCEDURE — 1160F RVW MEDS BY RX/DR IN RCRD: CPT | Performed by: ORTHOPAEDIC SURGERY

## 2023-12-11 PROCEDURE — 1159F MED LIST DOCD IN RCRD: CPT | Performed by: ORTHOPAEDIC SURGERY

## 2023-12-11 RX ORDER — SODIUM CHLORIDE, SODIUM LACTATE, POTASSIUM CHLORIDE, CALCIUM CHLORIDE 600; 310; 30; 20 MG/100ML; MG/100ML; MG/100ML; MG/100ML
100 INJECTION, SOLUTION INTRAVENOUS CONTINUOUS
OUTPATIENT
Start: 2024-03-25

## 2023-12-11 NOTE — PROGRESS NOTES
Subjective    Patient ID: Sandro Florez is a 73 y.o. male.    Chief Complaint: OTHER (F/U LT KNEE.)     Last Surgery: No surgery found  Last Surgery Date: No surgery found    HPI  Patient comes in follow-up for his left knee pain.  He had been treated conservatively for his left knee arthritis with Kenalog injections.  He states these have not provided any significant relief.  He now would like to discuss surgery.    Objective   Ortho Exam  Patient is in no acute distress.  He does walk with a mild antalgic gait favoring his left lower extremity.  Exam of his left knee reveals there is no warmth erythema.  He does have a moderate effusion.  He is tender more over the medial joint line and the lateral joint line.  Knee stable to varus and valgus stress testing.  Active range of motion is 0 to better than 124 degrees of flexion.    Image Results:  X-rays of his left knee were personally reviewed.  He is essentially bone-on-bone at the medial compartment.  He has moderate arthritic changes in the patellofemoral compartment.    Assessment/Plan   Encounter Diagnoses:  Arthritis of left knee    Preop general physical exam    Orders Placed This Encounter    Request for Pre-Admission Testing Visit    Enroll patient in total knee replacement care plan    Staphylococcus aureus/MRSA colonization, Culture    Urinalysis with Reflex Microscopic and Culture    CBC    Basic Metabolic Panel    Case Request Operating Room: Arthroplasty Total Knee     Patient has left knee pain secondary to arthritis.  He has tried and failed conservative management.  We then discussed surgical treatment options.  I discussed with him in detail the risk, benefits alternatives of a left total knee replacement.  The patient voiced understanding and informed consent was obtained.  The patient will call to schedule surgery.

## 2023-12-12 DIAGNOSIS — I10 BENIGN ESSENTIAL HTN: ICD-10-CM

## 2023-12-12 NOTE — PROGRESS NOTES
Per Dr Fitzpatrick, add amlodipine 5mg once daily to current regimen. Pt aware. Pended to Dr Fitzpatrick.

## 2023-12-15 RX ORDER — AMLODIPINE BESYLATE 5 MG/1
5 TABLET ORAL DAILY
Qty: 90 TABLET | Refills: 3 | Status: SHIPPED | OUTPATIENT
Start: 2023-12-15 | End: 2023-12-19 | Stop reason: SDUPTHER

## 2023-12-19 ENCOUNTER — OFFICE VISIT (OUTPATIENT)
Dept: PRIMARY CARE | Facility: CLINIC | Age: 73
End: 2023-12-19
Payer: MEDICARE

## 2023-12-19 ENCOUNTER — LAB (OUTPATIENT)
Dept: LAB | Facility: LAB | Age: 73
End: 2023-12-19
Payer: MEDICARE

## 2023-12-19 VITALS
WEIGHT: 178 LBS | OXYGEN SATURATION: 97 % | DIASTOLIC BLOOD PRESSURE: 92 MMHG | BODY MASS INDEX: 26.36 KG/M2 | HEART RATE: 80 BPM | HEIGHT: 69 IN | SYSTOLIC BLOOD PRESSURE: 174 MMHG

## 2023-12-19 DIAGNOSIS — I10 UNCONTROLLED HYPERTENSION: ICD-10-CM

## 2023-12-19 DIAGNOSIS — Z12.5 PROSTATE CANCER SCREENING: ICD-10-CM

## 2023-12-19 DIAGNOSIS — E78.5 HYPERLIPIDEMIA, UNSPECIFIED HYPERLIPIDEMIA TYPE: ICD-10-CM

## 2023-12-19 DIAGNOSIS — I10 BENIGN ESSENTIAL HTN: ICD-10-CM

## 2023-12-19 DIAGNOSIS — Z12.11 COLON CANCER SCREENING: ICD-10-CM

## 2023-12-19 DIAGNOSIS — E78.5 HYPERLIPIDEMIA, UNSPECIFIED HYPERLIPIDEMIA TYPE: Primary | ICD-10-CM

## 2023-12-19 DIAGNOSIS — Z00.00 HEALTH CARE MAINTENANCE: ICD-10-CM

## 2023-12-19 DIAGNOSIS — M17.12 ARTHRITIS OF LEFT KNEE: ICD-10-CM

## 2023-12-19 DIAGNOSIS — Z01.818 PREOP GENERAL PHYSICAL EXAM: ICD-10-CM

## 2023-12-19 DIAGNOSIS — Z91.148 NONCOMPLIANCE WITH MEDICATION REGIMEN: ICD-10-CM

## 2023-12-19 DIAGNOSIS — I65.22 STENOSIS OF LEFT CAROTID ARTERY: ICD-10-CM

## 2023-12-19 LAB
ANION GAP SERPL CALC-SCNC: 12 MMOL/L (ref 10–20)
BUN SERPL-MCNC: 14 MG/DL (ref 6–23)
CALCIUM SERPL-MCNC: 9.9 MG/DL (ref 8.6–10.6)
CHLORIDE SERPL-SCNC: 105 MMOL/L (ref 98–107)
CHOLEST SERPL-MCNC: 255 MG/DL (ref 0–199)
CHOLESTEROL/HDL RATIO: 6.2
CO2 SERPL-SCNC: 29 MMOL/L (ref 21–32)
CREAT SERPL-MCNC: 1 MG/DL (ref 0.5–1.3)
ERYTHROCYTE [DISTWIDTH] IN BLOOD BY AUTOMATED COUNT: 13.1 % (ref 11.5–14.5)
EST. AVERAGE GLUCOSE BLD GHB EST-MCNC: 100 MG/DL
GFR SERPL CREATININE-BSD FRML MDRD: 79 ML/MIN/1.73M*2
GLUCOSE SERPL-MCNC: 83 MG/DL (ref 74–99)
HBA1C MFR BLD: 5.1 %
HCT VFR BLD AUTO: 47.6 % (ref 41–52)
HDLC SERPL-MCNC: 41.4 MG/DL
HGB BLD-MCNC: 16.1 G/DL (ref 13.5–17.5)
LDLC SERPL CALC-MCNC: 156 MG/DL
MCH RBC QN AUTO: 31.4 PG (ref 26–34)
MCHC RBC AUTO-ENTMCNC: 33.8 G/DL (ref 32–36)
MCV RBC AUTO: 93 FL (ref 80–100)
NON HDL CHOLESTEROL: 214 MG/DL (ref 0–149)
NRBC BLD-RTO: 0 /100 WBCS (ref 0–0)
PLATELET # BLD AUTO: 293 X10*3/UL (ref 150–450)
POTASSIUM SERPL-SCNC: 4.6 MMOL/L (ref 3.5–5.3)
RBC # BLD AUTO: 5.12 X10*6/UL (ref 4.5–5.9)
SODIUM SERPL-SCNC: 141 MMOL/L (ref 136–145)
TRIGL SERPL-MCNC: 288 MG/DL (ref 0–149)
VLDL: 58 MG/DL (ref 0–40)
WBC # BLD AUTO: 5.7 X10*3/UL (ref 4.4–11.3)

## 2023-12-19 PROCEDURE — 1159F MED LIST DOCD IN RCRD: CPT | Performed by: STUDENT IN AN ORGANIZED HEALTH CARE EDUCATION/TRAINING PROGRAM

## 2023-12-19 PROCEDURE — 80048 BASIC METABOLIC PNL TOTAL CA: CPT

## 2023-12-19 PROCEDURE — 85027 COMPLETE CBC AUTOMATED: CPT

## 2023-12-19 PROCEDURE — G0103 PSA SCREENING: HCPCS

## 2023-12-19 PROCEDURE — 36415 COLL VENOUS BLD VENIPUNCTURE: CPT

## 2023-12-19 PROCEDURE — 99204 OFFICE O/P NEW MOD 45 MIN: CPT | Performed by: STUDENT IN AN ORGANIZED HEALTH CARE EDUCATION/TRAINING PROGRAM

## 2023-12-19 PROCEDURE — 1125F AMNT PAIN NOTED PAIN PRSNT: CPT | Performed by: STUDENT IN AN ORGANIZED HEALTH CARE EDUCATION/TRAINING PROGRAM

## 2023-12-19 PROCEDURE — 84154 ASSAY OF PSA FREE: CPT

## 2023-12-19 PROCEDURE — 3077F SYST BP >= 140 MM HG: CPT | Performed by: STUDENT IN AN ORGANIZED HEALTH CARE EDUCATION/TRAINING PROGRAM

## 2023-12-19 PROCEDURE — 1160F RVW MEDS BY RX/DR IN RCRD: CPT | Performed by: STUDENT IN AN ORGANIZED HEALTH CARE EDUCATION/TRAINING PROGRAM

## 2023-12-19 PROCEDURE — 83036 HEMOGLOBIN GLYCOSYLATED A1C: CPT

## 2023-12-19 PROCEDURE — 1036F TOBACCO NON-USER: CPT | Performed by: STUDENT IN AN ORGANIZED HEALTH CARE EDUCATION/TRAINING PROGRAM

## 2023-12-19 PROCEDURE — 3080F DIAST BP >= 90 MM HG: CPT | Performed by: STUDENT IN AN ORGANIZED HEALTH CARE EDUCATION/TRAINING PROGRAM

## 2023-12-19 PROCEDURE — 80061 LIPID PANEL: CPT

## 2023-12-19 RX ORDER — DOXAZOSIN 4 MG/1
4 TABLET ORAL NIGHTLY
Qty: 90 TABLET | Refills: 0 | Status: SHIPPED | OUTPATIENT
Start: 2023-12-19 | End: 2023-12-19 | Stop reason: SDUPTHER

## 2023-12-19 RX ORDER — AMLODIPINE BESYLATE 10 MG/1
10 TABLET ORAL DAILY
Qty: 90 TABLET | Refills: 0 | Status: SHIPPED | OUTPATIENT
Start: 2023-12-19 | End: 2023-12-19 | Stop reason: SDUPTHER

## 2023-12-19 RX ORDER — DOXAZOSIN 4 MG/1
4 TABLET ORAL NIGHTLY
Qty: 90 TABLET | Refills: 0 | Status: SHIPPED | OUTPATIENT
Start: 2023-12-19 | End: 2024-02-22 | Stop reason: WASHOUT

## 2023-12-19 RX ORDER — AMLODIPINE BESYLATE 10 MG/1
10 TABLET ORAL DAILY
Qty: 90 TABLET | Refills: 0 | Status: SHIPPED | OUTPATIENT
Start: 2023-12-19 | End: 2024-02-22 | Stop reason: SDUPTHER

## 2023-12-19 ASSESSMENT — ENCOUNTER SYMPTOMS
NEUROLOGICAL NEGATIVE: 1
CARDIOVASCULAR NEGATIVE: 1
CONSTITUTIONAL NEGATIVE: 1
GASTROINTESTINAL NEGATIVE: 1
PSYCHIATRIC NEGATIVE: 1
RESPIRATORY NEGATIVE: 1
MUSCULOSKELETAL NEGATIVE: 1

## 2023-12-19 NOTE — PROGRESS NOTES
"New patient here to est  Surgery clearance for left knee replacement.    Subjective   Patient ID: Sandro Florez is a 73 y.o. male.    Patient is a 73-year-old male with hypertension, hyperlipidemia, carotid artery disease and stenosis of left carotid artery, as well as diffuse arthritis who presents for establish care and medical clearance.  Patient states that he follows with Navin Sloan and is planning for surgical fixation of his knee.  Likely will need hip in the future however is doing knee first.  States that he is tolerating his medications, blood pressure medications has been titrated however he states he has not started amlodipine.  Otherwise he can achieve greater than 4 METS without any difficulty.  States no additional issues or concerns.    Past medical history as above  Past surgical history orthopedic surgeries  Social history denies any alcohol drug or tobacco use  Family history noncontributory        Review of Systems   Constitutional: Negative.    HENT: Negative.     Respiratory: Negative.     Cardiovascular: Negative.    Gastrointestinal: Negative.    Musculoskeletal: Negative.    Skin: Negative.    Neurological: Negative.    Psychiatric/Behavioral: Negative.         Objective Visit Vitals  BP (!) 174/92   Pulse 80   Ht 1.753 m (5' 9\")   Wt 80.7 kg (178 lb)   SpO2 97%   BMI 26.29 kg/m²   Smoking Status Former   BSA 1.98 m²      Physical Exam  Constitutional:       General: He is not in acute distress.     Appearance: He is not ill-appearing.   Eyes:      Pupils: Pupils are equal, round, and reactive to light.   Cardiovascular:      Rate and Rhythm: Normal rate and regular rhythm.      Pulses: Normal pulses.      Heart sounds: No murmur heard.  Pulmonary:      Effort: No respiratory distress.      Breath sounds: No wheezing.   Abdominal:      General: Abdomen is flat. Bowel sounds are normal. There is no distension.   Musculoskeletal:      Right lower leg: No edema.      Left lower leg: No " edema.   Skin:     General: Skin is warm and dry.   Neurological:      Mental Status: He is alert. Mental status is at baseline.      Cranial Nerves: No cranial nerve deficit.      Motor: No weakness.   Psychiatric:         Mood and Affect: Mood normal.         Behavior: Behavior normal.         Assessment/Plan   Diagnoses and all orders for this visit:  Hyperlipidemia, unspecified hyperlipidemia type  -     Lipid panel; Future  Health care maintenance  -     Hemoglobin A1C; Future  Noncompliance with medication regimen  Stenosis of left carotid artery  Benign essential HTN  -     amLODIPine (Norvasc) 10 mg tablet; Take 1 tablet (10 mg) by mouth once daily.  Prostate cancer screening  -     PSA, total and free; Future  Colon cancer screening  Uncontrolled hypertension  -     doxazosin (Cardura) 4 mg tablet; Take 1 tablet (4 mg) by mouth once daily at bedtime.  Patient seen on establishing care    #Hypertension  Significantly elevated today.  Recommend continuation of lisinopril to uptitrate doxazosin as well as amlodipine, he will closely monitor recommend close follow-up in 2 weeks to further evaluate    #Osteoarthritis  Significant issue long-term, he is able to achieve greater than 4 METS, medications adjustment as above, would be medically clear once blood pressure better controlled advise close follow-up as above    #Hyperlipidemia  Continue statin    #Stenosis of carotid artery  Follows with cardiology, continue follow-up    #Healthcare maintenance  Routine labs today  Advise age-appropriate vaccinations  Depression screen negative  Check PSA, up-to-date on colonoscopy screening    Return to care in 2 weeks for blood pressure check or as needed

## 2023-12-21 LAB
PSA FREE MFR SERPL: 22 %
PSA FREE SERPL-MCNC: 0.6 NG/ML
PSA SERPL IA-MCNC: 2.7 NG/ML (ref 0–4)

## 2023-12-30 DIAGNOSIS — E78.5 HYPERLIPIDEMIA, UNSPECIFIED: ICD-10-CM

## 2024-01-02 RX ORDER — ROSUVASTATIN CALCIUM 40 MG/1
40 TABLET, COATED ORAL DAILY
Qty: 90 TABLET | Refills: 3 | Status: SHIPPED | OUTPATIENT
Start: 2024-01-02

## 2024-01-09 ENCOUNTER — OFFICE VISIT (OUTPATIENT)
Dept: PAIN MEDICINE | Facility: CLINIC | Age: 74
End: 2024-01-09
Payer: MEDICARE

## 2024-01-09 VITALS
HEIGHT: 69 IN | SYSTOLIC BLOOD PRESSURE: 187 MMHG | HEART RATE: 81 BPM | RESPIRATION RATE: 18 BRPM | TEMPERATURE: 98.8 F | DIASTOLIC BLOOD PRESSURE: 91 MMHG | BODY MASS INDEX: 25.92 KG/M2 | WEIGHT: 175 LBS

## 2024-01-09 DIAGNOSIS — M13.0 POLYARTHRITIS: Primary | ICD-10-CM

## 2024-01-09 DIAGNOSIS — M17.12 PRIMARY OSTEOARTHRITIS OF LEFT KNEE: ICD-10-CM

## 2024-01-09 PROCEDURE — 99214 OFFICE O/P EST MOD 30 MIN: CPT | Performed by: ANESTHESIOLOGY

## 2024-01-09 PROCEDURE — 3080F DIAST BP >= 90 MM HG: CPT | Performed by: ANESTHESIOLOGY

## 2024-01-09 PROCEDURE — 1159F MED LIST DOCD IN RCRD: CPT | Performed by: ANESTHESIOLOGY

## 2024-01-09 PROCEDURE — 1160F RVW MEDS BY RX/DR IN RCRD: CPT | Performed by: ANESTHESIOLOGY

## 2024-01-09 PROCEDURE — 99204 OFFICE O/P NEW MOD 45 MIN: CPT | Performed by: ANESTHESIOLOGY

## 2024-01-09 PROCEDURE — 1125F AMNT PAIN NOTED PAIN PRSNT: CPT | Performed by: ANESTHESIOLOGY

## 2024-01-09 PROCEDURE — 3077F SYST BP >= 140 MM HG: CPT | Performed by: ANESTHESIOLOGY

## 2024-01-09 PROCEDURE — 1036F TOBACCO NON-USER: CPT | Performed by: ANESTHESIOLOGY

## 2024-01-09 RX ORDER — DULOXETIN HYDROCHLORIDE 30 MG/1
CAPSULE, DELAYED RELEASE ORAL
Qty: 60 CAPSULE | Refills: 0 | Status: SHIPPED | OUTPATIENT
Start: 2024-01-09 | End: 2024-02-15

## 2024-01-09 ASSESSMENT — ENCOUNTER SYMPTOMS
ABDOMINAL PAIN: 0
DEPRESSION: 0
WEAKNESS: 0
ADENOPATHY: 0
EYE PAIN: 0
SHORTNESS OF BREATH: 1
ARTHRALGIAS: 1
LOSS OF SENSATION IN FEET: 0
FREQUENCY: 1
OCCASIONAL FEELINGS OF UNSTEADINESS: 1
FEVER: 0

## 2024-01-09 ASSESSMENT — PAIN SCALES - GENERAL
PAINLEVEL_OUTOF10: 8
PAINLEVEL: 8

## 2024-01-09 ASSESSMENT — LIFESTYLE VARIABLES: TOTAL SCORE: 2

## 2024-01-09 ASSESSMENT — PAIN DESCRIPTION - DESCRIPTORS: DESCRIPTORS: ACHING;BURNING;SORE

## 2024-01-09 ASSESSMENT — PAIN - FUNCTIONAL ASSESSMENT: PAIN_FUNCTIONAL_ASSESSMENT: 0-10

## 2024-01-09 ASSESSMENT — PATIENT HEALTH QUESTIONNAIRE - PHQ9
SUM OF ALL RESPONSES TO PHQ9 QUESTIONS 1 AND 2: 0
1. LITTLE INTEREST OR PLEASURE IN DOING THINGS: NOT AT ALL
2. FEELING DOWN, DEPRESSED OR HOPELESS: NOT AT ALL

## 2024-01-09 NOTE — PROGRESS NOTES
"Chief Complain    New Patient Visit (For severe aritis pain in b/l knees, hips, elbows, hands and wrist, and been going on a couple of years. Deny neck and back surgery. Have images on file. Had 2 surgeries on arms and on left knee.Currently take prednisone, diclofenac sodium and had cortione injections and it did not help.)    History Of Present Illness  Sandro Florez is a 73 y.o. male here for evaluation of bilateral hand, right elbow, right ankle, left knee and left hip pain.  The patient has been experiencing these symptoms for last 2 year(s). The patient describes the pain as sharp. The patient's current pain score is 7-8 on a scale from 0-10. The pain is worsened by walking and \"can not do anything\"  and is alleviated by  hot bath and resting . Since the start of the symptoms the pain has been worse.    The patient denies any fever, chills, weight loss, weakness, bladder/ bowel incontinence, history of cancer, history of IV drug abuse, recent trauma.  + Numbness in right ankle and hands    Rx Tried  Meloxicam  Tramadol  Lyrica    Currently on Diclofenac 75 mg    Tried multiple knee injection    Has not tried PT    Past Medical History  He has a past medical history of Abnormal findings on diagnostic imaging of other specified body structures, Other meniscus derangements, unspecified meniscus, unspecified knee (12/15/2020), Personal history of other diseases of the nervous system and sense organs (01/30/2015), Personal history of other infectious and parasitic diseases, Personal history of other infectious and parasitic diseases, Personal history of other medical treatment, and Unspecified abdominal hernia without obstruction or gangrene.    Surgical History  He has a past surgical history that includes Knee surgery (01/30/2015); Other surgical history (01/30/2015); Other surgical history (12/15/2020); Other surgical history (12/15/2020); and Other surgical history (12/15/2020).    Social History  He reports " that he has quit smoking. His smoking use included cigarettes. He has never used smokeless tobacco. He reports current alcohol use of about 4.0 standard drinks of alcohol per week. He reports that he does not use drugs.    Family History  Family History   Problem Relation Name Age of Onset    No Known Problems Mother      No Known Problems Father          Allergies  Patient has no known allergies.    Review of Systems  Review of Systems   Constitutional:  Negative for fever.   HENT:  Negative for ear pain.    Eyes:  Negative for pain.   Respiratory:  Positive for shortness of breath.         Recommended evaluation by PCP for SOB   Cardiovascular:  Negative for chest pain.   Gastrointestinal:  Negative for abdominal pain.   Endocrine: Negative for cold intolerance and heat intolerance.   Genitourinary:  Positive for frequency.   Musculoskeletal:  Positive for arthralgias.   Skin:  Negative for rash.   Allergic/Immunologic: Negative for food allergies.   Neurological:  Negative for weakness.   Hematological:  Negative for adenopathy.   Psychiatric/Behavioral:  Negative for suicidal ideas.         Physical Exam  Physical Exam  HENT:      Head: Normocephalic and atraumatic.      Right Ear: External ear normal.      Left Ear: External ear normal.      Nose: Nose normal.      Mouth/Throat:      Mouth: Mucous membranes are moist.   Eyes:      Extraocular Movements: Extraocular movements intact.   Cardiovascular:      Rate and Rhythm: Normal rate.   Pulmonary:      Effort: Pulmonary effort is normal.   Abdominal:      Palpations: Abdomen is soft.   Musculoskeletal:      Cervical back: Neck supple.   Skin:     General: Skin is warm.   Neurological:      Mental Status: He is alert and oriented to person, place, and time.   Psychiatric:         Mood and Affect: Mood normal.         Behavior: Behavior normal.           Last Recorded Vitals  Blood pressure (!) 187/91, pulse 81, temperature 37.1 °C (98.8 °F), resp. rate 18,  "height 1.753 m (5' 9\"), weight 79.4 kg (175 lb).      Reviewed Labs      Latest Reference Range & Units 12/19/23 12:07   GLUCOSE 74 - 99 mg/dL 83   SODIUM 136 - 145 mmol/L 141   POTASSIUM 3.5 - 5.3 mmol/L 4.6   CHLORIDE 98 - 107 mmol/L 105   Bicarbonate 21 - 32 mmol/L 29   Anion Gap 10 - 20 mmol/L 12   Blood Urea Nitrogen 6 - 23 mg/dL 14   Creatinine 0.50 - 1.30 mg/dL 1.00   EGFR >60 mL/min/1.73m*2 79   Calcium 8.6 - 10.6 mg/dL 9.9        Assessment/Plan   Encounter Diagnosis   Name Primary?    Polyarthritis Yes        Sandro Florez is a 73 y.o. male here for evaluation of  bilateral hand, right elbow, right ankle, left knee and left hip pain.  He has been experiencing the symptoms for last couple of years or so.  He has been evaluated by rheumatology was negative for any rheumatological condition, has been diagnosed with osteoarthritis.  Currently he is worst affected his left knee and left hip.  He has had several corticosteroid injection in the left knee without any significant benefit.  He has been evaluated by orthopedics planning on getting knee replacement done in February.  Currently he is working at a [employer] shop, also dealing with health issues related to his wife.  He has previously failed NSAIDs, tramadol and Lyrica.  I would recommend trial of physical therapy, psychology for cognitive behavioral therapy, would also trial him on Cymbalta.  Discussed cryoneurolysis procedure which may provide some pain relief for up to 4 months or so, he agrees to proceed with cryoneurolysis procedure if approved by insurance.  Prescription also provided for compounded ointment.    I spent 47 minutes in the professional and overall care of this patient.       Guillermo Ramirez MD  "

## 2024-01-09 NOTE — H&P (VIEW-ONLY)
"Chief Complain    New Patient Visit (For severe aritis pain in b/l knees, hips, elbows, hands and wrist, and been going on a couple of years. Deny neck and back surgery. Have images on file. Had 2 surgeries on arms and on left knee.Currently take prednisone, diclofenac sodium and had cortione injections and it did not help.)    History Of Present Illness  Sandro Florez is a 73 y.o. male here for evaluation of bilateral hand, right elbow, right ankle, left knee and left hip pain.  The patient has been experiencing these symptoms for last 2 year(s). The patient describes the pain as sharp. The patient's current pain score is 7-8 on a scale from 0-10. The pain is worsened by walking and \"can not do anything\"  and is alleviated by  hot bath and resting . Since the start of the symptoms the pain has been worse.    The patient denies any fever, chills, weight loss, weakness, bladder/ bowel incontinence, history of cancer, history of IV drug abuse, recent trauma.  + Numbness in right ankle and hands    Rx Tried  Meloxicam  Tramadol  Lyrica    Currently on Diclofenac 75 mg    Tried multiple knee injection    Has not tried PT    Past Medical History  He has a past medical history of Abnormal findings on diagnostic imaging of other specified body structures, Other meniscus derangements, unspecified meniscus, unspecified knee (12/15/2020), Personal history of other diseases of the nervous system and sense organs (01/30/2015), Personal history of other infectious and parasitic diseases, Personal history of other infectious and parasitic diseases, Personal history of other medical treatment, and Unspecified abdominal hernia without obstruction or gangrene.    Surgical History  He has a past surgical history that includes Knee surgery (01/30/2015); Other surgical history (01/30/2015); Other surgical history (12/15/2020); Other surgical history (12/15/2020); and Other surgical history (12/15/2020).    Social History  He reports " that he has quit smoking. His smoking use included cigarettes. He has never used smokeless tobacco. He reports current alcohol use of about 4.0 standard drinks of alcohol per week. He reports that he does not use drugs.    Family History  Family History   Problem Relation Name Age of Onset    No Known Problems Mother      No Known Problems Father          Allergies  Patient has no known allergies.    Review of Systems  Review of Systems   Constitutional:  Negative for fever.   HENT:  Negative for ear pain.    Eyes:  Negative for pain.   Respiratory:  Positive for shortness of breath.         Recommended evaluation by PCP for SOB   Cardiovascular:  Negative for chest pain.   Gastrointestinal:  Negative for abdominal pain.   Endocrine: Negative for cold intolerance and heat intolerance.   Genitourinary:  Positive for frequency.   Musculoskeletal:  Positive for arthralgias.   Skin:  Negative for rash.   Allergic/Immunologic: Negative for food allergies.   Neurological:  Negative for weakness.   Hematological:  Negative for adenopathy.   Psychiatric/Behavioral:  Negative for suicidal ideas.         Physical Exam  Physical Exam  HENT:      Head: Normocephalic and atraumatic.      Right Ear: External ear normal.      Left Ear: External ear normal.      Nose: Nose normal.      Mouth/Throat:      Mouth: Mucous membranes are moist.   Eyes:      Extraocular Movements: Extraocular movements intact.   Cardiovascular:      Rate and Rhythm: Normal rate.   Pulmonary:      Effort: Pulmonary effort is normal.   Abdominal:      Palpations: Abdomen is soft.   Musculoskeletal:      Cervical back: Neck supple.   Skin:     General: Skin is warm.   Neurological:      Mental Status: He is alert and oriented to person, place, and time.   Psychiatric:         Mood and Affect: Mood normal.         Behavior: Behavior normal.           Last Recorded Vitals  Blood pressure (!) 187/91, pulse 81, temperature 37.1 °C (98.8 °F), resp. rate 18,  "height 1.753 m (5' 9\"), weight 79.4 kg (175 lb).      Reviewed Labs      Latest Reference Range & Units 12/19/23 12:07   GLUCOSE 74 - 99 mg/dL 83   SODIUM 136 - 145 mmol/L 141   POTASSIUM 3.5 - 5.3 mmol/L 4.6   CHLORIDE 98 - 107 mmol/L 105   Bicarbonate 21 - 32 mmol/L 29   Anion Gap 10 - 20 mmol/L 12   Blood Urea Nitrogen 6 - 23 mg/dL 14   Creatinine 0.50 - 1.30 mg/dL 1.00   EGFR >60 mL/min/1.73m*2 79   Calcium 8.6 - 10.6 mg/dL 9.9        Assessment/Plan   Encounter Diagnosis   Name Primary?    Polyarthritis Yes        Sandro Florez is a 73 y.o. male here for evaluation of  bilateral hand, right elbow, right ankle, left knee and left hip pain.  He has been experiencing the symptoms for last couple of years or so.  He has been evaluated by rheumatology was negative for any rheumatological condition, has been diagnosed with osteoarthritis.  Currently he is worst affected his left knee and left hip.  He has had several corticosteroid injection in the left knee without any significant benefit.  He has been evaluated by orthopedics planning on getting knee replacement done in February.  Currently he is working at a [employer] shop, also dealing with health issues related to his wife.  He has previously failed NSAIDs, tramadol and Lyrica.  I would recommend trial of physical therapy, psychology for cognitive behavioral therapy, would also trial him on Cymbalta.  Discussed cryoneurolysis procedure which may provide some pain relief for up to 4 months or so, he agrees to proceed with cryoneurolysis procedure if approved by insurance.  Prescription also provided for compounded ointment.    I spent 47 minutes in the professional and overall care of this patient.       Guillermo Ramirez MD  "

## 2024-01-11 PROBLEM — H93.8X2 SENSATION OF PLUGGED EAR ON LEFT SIDE: Status: ACTIVE | Noted: 2024-01-11

## 2024-01-11 PROBLEM — H60.399 ACUTE INFECTIVE OTITIS EXTERNA: Status: ACTIVE | Noted: 2024-01-11

## 2024-01-11 PROBLEM — Z86.19 HISTORY OF VARICELLA: Status: ACTIVE | Noted: 2024-01-11

## 2024-01-11 PROBLEM — H92.02 LEFT EAR PAIN: Status: ACTIVE | Noted: 2024-01-11

## 2024-01-11 PROBLEM — R07.89 CHEST DISCOMFORT: Status: ACTIVE | Noted: 2022-11-17

## 2024-01-11 PROBLEM — H93.13 TINNITUS OF BOTH EARS: Status: ACTIVE | Noted: 2024-01-11

## 2024-01-11 PROBLEM — M25.569 KNEE PAIN: Status: ACTIVE | Noted: 2023-05-01

## 2024-01-11 PROBLEM — H92.12 OTORRHEA OF LEFT EAR: Status: ACTIVE | Noted: 2024-01-11

## 2024-01-11 PROBLEM — M25.512 PAIN OF LEFT SHOULDER REGION: Status: ACTIVE | Noted: 2024-01-11

## 2024-01-11 PROBLEM — M25.569 ARTHRALGIA OF KNEE: Status: ACTIVE | Noted: 2023-10-02

## 2024-01-11 PROBLEM — M23.309 DERANGEMENT OF MENISCUS: Status: ACTIVE | Noted: 2024-01-11

## 2024-01-11 PROBLEM — H61.20 EXCESSIVE CERUMEN IN EAR CANAL: Status: ACTIVE | Noted: 2024-01-11

## 2024-01-11 PROBLEM — M54.9 BACK PAIN: Status: ACTIVE | Noted: 2022-09-30

## 2024-01-11 PROBLEM — R09.89 CAROTID BRUIT: Status: ACTIVE | Noted: 2024-01-11

## 2024-01-11 PROBLEM — R69 DISORDER: Status: ACTIVE | Noted: 2024-01-11

## 2024-01-11 PROBLEM — H62.40 OTOMYCOSIS: Status: ACTIVE | Noted: 2024-01-11

## 2024-01-11 PROBLEM — Z86.19 HISTORY OF MEASLES: Status: ACTIVE | Noted: 2024-01-11

## 2024-01-11 PROBLEM — I65.29 STENOSIS OF CAROTID ARTERY: Status: ACTIVE | Noted: 2023-10-02

## 2024-01-11 PROBLEM — B36.9 OTOMYCOSIS: Status: ACTIVE | Noted: 2024-01-11

## 2024-01-11 PROBLEM — M79.601 PAIN OF RIGHT UPPER EXTREMITY: Status: ACTIVE | Noted: 2023-10-02

## 2024-01-24 ENCOUNTER — HOSPITAL ENCOUNTER (OUTPATIENT)
Dept: PAIN MEDICINE | Facility: CLINIC | Age: 74
Discharge: HOME | End: 2024-01-24
Payer: MEDICARE

## 2024-01-24 ENCOUNTER — HOSPITAL ENCOUNTER (OUTPATIENT)
Dept: RADIOLOGY | Facility: CLINIC | Age: 74
Discharge: HOME | End: 2024-01-24
Payer: MEDICARE

## 2024-01-24 VITALS
BODY MASS INDEX: 25.92 KG/M2 | HEART RATE: 72 BPM | DIASTOLIC BLOOD PRESSURE: 77 MMHG | HEIGHT: 69 IN | TEMPERATURE: 98.1 F | WEIGHT: 175 LBS | SYSTOLIC BLOOD PRESSURE: 189 MMHG | OXYGEN SATURATION: 95 % | RESPIRATION RATE: 18 BRPM

## 2024-01-24 DIAGNOSIS — M13.0 POLYARTHRITIS: ICD-10-CM

## 2024-01-24 PROCEDURE — 2500000004 HC RX 250 GENERAL PHARMACY W/ HCPCS (ALT 636 FOR OP/ED)

## 2024-01-24 PROCEDURE — 64624 DSTRJ NULYT AGT GNCLR NRV: CPT | Performed by: ANESTHESIOLOGY

## 2024-01-24 PROCEDURE — 2500000005 HC RX 250 GENERAL PHARMACY W/O HCPCS

## 2024-01-24 PROCEDURE — 77003 FLUOROGUIDE FOR SPINE INJECT: CPT

## 2024-01-24 RX ORDER — LIDOCAINE HYDROCHLORIDE 10 MG/ML
INJECTION, SOLUTION EPIDURAL; INFILTRATION; INTRACAUDAL; PERINEURAL
Status: COMPLETED
Start: 2024-01-24 | End: 2024-01-24

## 2024-01-24 RX ORDER — TRIAMCINOLONE ACETONIDE 40 MG/ML
INJECTION, SUSPENSION INTRA-ARTICULAR; INTRAMUSCULAR
Status: COMPLETED
Start: 2024-01-24 | End: 2024-01-24

## 2024-01-24 RX ORDER — BUPIVACAINE HYDROCHLORIDE 7.5 MG/ML
INJECTION, SOLUTION EPIDURAL; RETROBULBAR
Status: COMPLETED
Start: 2024-01-24 | End: 2024-01-24

## 2024-01-24 RX ADMIN — BUPIVACAINE HYDROCHLORIDE 75 MG: 7.5 INJECTION, SOLUTION EPIDURAL; RETROBULBAR at 13:46

## 2024-01-24 RX ADMIN — LIDOCAINE HYDROCHLORIDE 50 MG: 10 INJECTION, SOLUTION EPIDURAL; INFILTRATION; INTRACAUDAL; PERINEURAL at 13:46

## 2024-01-24 RX ADMIN — LIDOCAINE HYDROCHLORIDE 50 MG: 10 INJECTION, SOLUTION EPIDURAL; INFILTRATION; INTRACAUDAL; PERINEURAL at 14:09

## 2024-01-24 RX ADMIN — TRIAMCINOLONE ACETONIDE 40 MG: 40 INJECTION, SUSPENSION INTRA-ARTICULAR; INTRAMUSCULAR at 13:46

## 2024-01-24 SDOH — ECONOMIC STABILITY: FOOD INSECURITY: WITHIN THE PAST 12 MONTHS, YOU WORRIED THAT YOUR FOOD WOULD RUN OUT BEFORE YOU GOT MONEY TO BUY MORE.: NEVER TRUE

## 2024-01-24 SDOH — ECONOMIC STABILITY: FOOD INSECURITY: WITHIN THE PAST 12 MONTHS, THE FOOD YOU BOUGHT JUST DIDN'T LAST AND YOU DIDN'T HAVE MONEY TO GET MORE.: NEVER TRUE

## 2024-01-24 ASSESSMENT — PATIENT HEALTH QUESTIONNAIRE - PHQ9
1. LITTLE INTEREST OR PLEASURE IN DOING THINGS: NOT AT ALL
2. FEELING DOWN, DEPRESSED OR HOPELESS: NOT AT ALL
SUM OF ALL RESPONSES TO PHQ9 QUESTIONS 1 & 2: 0

## 2024-01-24 ASSESSMENT — PAIN SCALES - GENERAL
PAINLEVEL_OUTOF10: 7
PAINLEVEL_OUTOF10: 0 - NO PAIN

## 2024-01-24 ASSESSMENT — COLUMBIA-SUICIDE SEVERITY RATING SCALE - C-SSRS
1. IN THE PAST MONTH, HAVE YOU WISHED YOU WERE DEAD OR WISHED YOU COULD GO TO SLEEP AND NOT WAKE UP?: NO
6. HAVE YOU EVER DONE ANYTHING, STARTED TO DO ANYTHING, OR PREPARED TO DO ANYTHING TO END YOUR LIFE?: NO
2. HAVE YOU ACTUALLY HAD ANY THOUGHTS OF KILLING YOURSELF?: NO

## 2024-01-24 ASSESSMENT — LIFESTYLE VARIABLES
HOW OFTEN DO YOU HAVE SIX OR MORE DRINKS ON ONE OCCASION: NEVER
HOW OFTEN DO YOU HAVE A DRINK CONTAINING ALCOHOL: MONTHLY OR LESS
AUDIT-C TOTAL SCORE: 1
HOW MANY STANDARD DRINKS CONTAINING ALCOHOL DO YOU HAVE ON A TYPICAL DAY: 1 OR 2
SKIP TO QUESTIONS 9-10: 1

## 2024-01-24 ASSESSMENT — PAIN - FUNCTIONAL ASSESSMENT
PAIN_FUNCTIONAL_ASSESSMENT: 0-10
PAIN_FUNCTIONAL_ASSESSMENT: 0-10

## 2024-01-24 ASSESSMENT — ENCOUNTER SYMPTOMS
DEPRESSION: 0
OCCASIONAL FEELINGS OF UNSTEADINESS: 1
LOSS OF SENSATION IN FEET: 1

## 2024-01-24 NOTE — OP NOTE
Procedure Note     Date: 2024  OR Location: [facility] NON-OR PROCEDURES    Name: Sandro Florez, : 1950, Age: 73 y.o., MRN: [id number], Sex: male    Diagnosis  Preprocedure diagnosis: left knee pain  Postprocedure diagnosis: Same    The patient was seen in the preoperative area. The risks, benefits, complications, treatment options, non-operative alternatives, expected recovery and outcomes were discussed with the patient. The patient concurred with the proposed plan, giving informed consent.      Genicular nerve cryoablation procedure details:    After taking informed consent, discussing risks benefits and alternative.  The patient was placed in supine position the left knee was prepped ChloraPrep. For anterior femoral cutaneous nerve, nerve was identified using ultrasound overlying the fascia superficial rectus femoris muscle.  Needle entry site and path was anesthetized with the 4 mL of 1% lidocaine.  Under direct ultrasound guidance 190 Iovera needle was advanced superficial to AFCN.  Cryoneurolysis lesion was created.  Similarly infra patellar branch of saphenous nerve was identified using ultrasound. Needle entry site and path was anesthetized with the 4 mL of 1% lidocaine.  Under direct ultrasound guidance 190 Iovera needle was advanced superficial to AFCN.  Iovera system was used to create a cryoneurolysis lesion for infrapatellar branch of saphenous nerve. Then using fluoroscopic guidance needle entry site and path to left superomedial,  superolateral and inferio-medial genicular nerves were anesthetized by 1%lidocaine.  190 Iovera needle was advanced first to target superolateral genicular nerve, then to target superomedial genicular nerve, follow by targeting inferiomedial genicular nerve. Iovera system was used to create a cryoneurolysis lesion targeting left superomedial, superolateral, inferiomedia genicular nerves.  Patient tolerated the procedure without any obvious  complications        Complications:  None; patient tolerated the procedure well.    Disposition: Home  Condition: stable         Additional Details: NA    Attending Attestation: I performed the procedure.    Guillermo Ramirez MD

## 2024-01-25 ENCOUNTER — TELEPHONE (OUTPATIENT)
Dept: PAIN MEDICINE | Facility: CLINIC | Age: 74
End: 2024-01-25
Payer: MEDICARE

## 2024-01-25 NOTE — LETTER
January 26, 2024     Patient: Sandro Florez   YOB: 1950   Date of Visit: 1/25/2024       To Whom It May Concern:    It is my medical opinion that Sandro Florez may return to work on 2/5/2024 .    If you have any questions or concerns, please don't hesitate to call.         Sincerely,      Guillermo Ramirez MD

## 2024-02-02 ENCOUNTER — OFFICE VISIT (OUTPATIENT)
Dept: PAIN MEDICINE | Facility: CLINIC | Age: 74
End: 2024-02-02
Payer: MEDICARE

## 2024-02-02 ENCOUNTER — HOSPITAL ENCOUNTER (OUTPATIENT)
Dept: RADIOLOGY | Facility: HOSPITAL | Age: 74
Discharge: HOME | End: 2024-02-02
Payer: MEDICARE

## 2024-02-02 VITALS
RESPIRATION RATE: 18 BRPM | TEMPERATURE: 96.3 F | DIASTOLIC BLOOD PRESSURE: 93 MMHG | HEIGHT: 69 IN | WEIGHT: 175 LBS | BODY MASS INDEX: 25.92 KG/M2 | HEART RATE: 81 BPM | SYSTOLIC BLOOD PRESSURE: 203 MMHG

## 2024-02-02 DIAGNOSIS — G89.29 CHRONIC PAIN OF LEFT KNEE: ICD-10-CM

## 2024-02-02 DIAGNOSIS — M25.562 CHRONIC PAIN OF LEFT KNEE: ICD-10-CM

## 2024-02-02 DIAGNOSIS — Z79.891 LONG TERM (CURRENT) USE OF OPIATE ANALGESIC: Primary | ICD-10-CM

## 2024-02-02 LAB
AMPHETAMINES UR QL SCN: ABNORMAL
BARBITURATES UR QL SCN: ABNORMAL
BZE UR QL SCN: ABNORMAL
CANNABINOIDS UR QL SCN: ABNORMAL
CREAT UR-MCNC: 90.3 MG/DL (ref 20–370)
PCP UR QL SCN: ABNORMAL

## 2024-02-02 PROCEDURE — 3080F DIAST BP >= 90 MM HG: CPT | Performed by: ANESTHESIOLOGY

## 2024-02-02 PROCEDURE — 1159F MED LIST DOCD IN RCRD: CPT | Performed by: ANESTHESIOLOGY

## 2024-02-02 PROCEDURE — 99214 OFFICE O/P EST MOD 30 MIN: CPT | Mod: 27 | Performed by: ANESTHESIOLOGY

## 2024-02-02 PROCEDURE — 1036F TOBACCO NON-USER: CPT | Performed by: ANESTHESIOLOGY

## 2024-02-02 PROCEDURE — 99214 OFFICE O/P EST MOD 30 MIN: CPT | Performed by: ANESTHESIOLOGY

## 2024-02-02 PROCEDURE — 80346 BENZODIAZEPINES1-12: CPT | Performed by: ANESTHESIOLOGY

## 2024-02-02 PROCEDURE — 1160F RVW MEDS BY RX/DR IN RCRD: CPT | Performed by: ANESTHESIOLOGY

## 2024-02-02 PROCEDURE — 80307 DRUG TEST PRSMV CHEM ANLYZR: CPT | Mod: MUE | Performed by: ANESTHESIOLOGY

## 2024-02-02 PROCEDURE — 80349 CANNABINOIDS NATURAL: CPT | Performed by: ANESTHESIOLOGY

## 2024-02-02 PROCEDURE — 1125F AMNT PAIN NOTED PAIN PRSNT: CPT | Performed by: ANESTHESIOLOGY

## 2024-02-02 PROCEDURE — 3077F SYST BP >= 140 MM HG: CPT | Performed by: ANESTHESIOLOGY

## 2024-02-02 PROCEDURE — 73564 X-RAY EXAM KNEE 4 OR MORE: CPT | Mod: LEFT SIDE | Performed by: RADIOLOGY

## 2024-02-02 PROCEDURE — 73564 X-RAY EXAM KNEE 4 OR MORE: CPT | Mod: LT

## 2024-02-02 RX ORDER — HYDROCODONE BITARTRATE AND ACETAMINOPHEN 5; 325 MG/1; MG/1
1 TABLET ORAL 2 TIMES DAILY PRN
Qty: 30 TABLET | Refills: 0 | Status: SHIPPED | OUTPATIENT
Start: 2024-02-02 | End: 2024-02-16 | Stop reason: SDUPTHER

## 2024-02-02 SDOH — ECONOMIC STABILITY: FOOD INSECURITY: WITHIN THE PAST 12 MONTHS, THE FOOD YOU BOUGHT JUST DIDN'T LAST AND YOU DIDN'T HAVE MONEY TO GET MORE.: NEVER TRUE

## 2024-02-02 SDOH — ECONOMIC STABILITY: FOOD INSECURITY: WITHIN THE PAST 12 MONTHS, YOU WORRIED THAT YOUR FOOD WOULD RUN OUT BEFORE YOU GOT MONEY TO BUY MORE.: NEVER TRUE

## 2024-02-02 ASSESSMENT — PAIN SCALES - GENERAL
PAINLEVEL_OUTOF10: 8
PAINLEVEL: 8

## 2024-02-02 ASSESSMENT — LIFESTYLE VARIABLES
HOW MANY STANDARD DRINKS CONTAINING ALCOHOL DO YOU HAVE ON A TYPICAL DAY: PATIENT DOES NOT DRINK
SKIP TO QUESTIONS 9-10: 1
AUDIT-C TOTAL SCORE: 0
HOW OFTEN DO YOU HAVE A DRINK CONTAINING ALCOHOL: NEVER
HOW OFTEN DO YOU HAVE SIX OR MORE DRINKS ON ONE OCCASION: NEVER

## 2024-02-02 ASSESSMENT — ENCOUNTER SYMPTOMS
SHORTNESS OF BREATH: 0
FEVER: 0
LOSS OF SENSATION IN FEET: 1
DEPRESSION: 0
OCCASIONAL FEELINGS OF UNSTEADINESS: 0
ARTHRALGIAS: 1

## 2024-02-02 ASSESSMENT — PAIN DESCRIPTION - DESCRIPTORS: DESCRIPTORS: BURNING;NUMBNESS

## 2024-02-02 ASSESSMENT — PATIENT HEALTH QUESTIONNAIRE - PHQ9
1. LITTLE INTEREST OR PLEASURE IN DOING THINGS: NOT AT ALL
SUM OF ALL RESPONSES TO PHQ9 QUESTIONS 1 AND 2: 0
2. FEELING DOWN, DEPRESSED OR HOPELESS: NOT AT ALL

## 2024-02-02 ASSESSMENT — PAIN - FUNCTIONAL ASSESSMENT: PAIN_FUNCTIONAL_ASSESSMENT: 0-10

## 2024-02-02 NOTE — PROGRESS NOTES
Chief Complain  Follow-up (From cryo ablation on 1/24, pain has got significantly worse)       History Of Present Illness  Sandro Florez is a 73 y.o. male here for left knee pain. The patient rates the pain at 8  on a scale from 0-10.  The patient describes pain as burning and numbness.  The pain is worsened by standing and walking and is alleviated by sitting.  Since the last visit the pain has worsened.  The patient denies any fever, chills, weight loss, bladder/bowel incontinence.         Past Medical History  He has a past medical history of Abnormal findings on diagnostic imaging of other specified body structures, Other meniscus derangements, unspecified meniscus, unspecified knee (12/15/2020), Personal history of other diseases of the nervous system and sense organs (01/30/2015), Personal history of other infectious and parasitic diseases, Personal history of other infectious and parasitic diseases, Personal history of other medical treatment, and Unspecified abdominal hernia without obstruction or gangrene.    Surgical History  He has a past surgical history that includes Knee surgery (01/30/2015); Other surgical history (01/30/2015); Other surgical history (12/15/2020); Other surgical history (12/15/2020); and Other surgical history (12/15/2020).     Social History  He reports that he has quit smoking. His smoking use included cigarettes. He has never used smokeless tobacco. He reports current alcohol use of about 4.0 standard drinks of alcohol per week. He reports that he does not use drugs.    Family History  Family History   Problem Relation Name Age of Onset    No Known Problems Mother      No Known Problems Father          Allergies  Patient has no known allergies.    Review of Systems  Review of Systems   Constitutional:  Negative for fever.   Respiratory:  Negative for shortness of breath.    Cardiovascular:  Negative for chest pain.   Musculoskeletal:  Positive for arthralgias.  "  Psychiatric/Behavioral:  Negative for suicidal ideas.         Physical Exam  Physical Exam  HENT:      Head: Normocephalic and atraumatic.   Eyes:      Extraocular Movements: Extraocular movements intact.   Pulmonary:      Effort: Pulmonary effort is normal.   Musculoskeletal:      Cervical back: Neck supple.   Neurological:      Mental Status: He is alert.   Psychiatric:         Mood and Affect: Mood normal.           Last Recorded Vitals  Blood pressure (!) 203/93, pulse 81, temperature 35.7 °C (96.3 °F), resp. rate 18, height 1.753 m (5' 9\"), weight 79.4 kg (175 lb).       Assessment/Plan     Sandro Florez is a 73 y.o. male here for follow-up of widespread polyarthralgia, worse however being the left-sided knee pain.  He has previously been eval by rheumatology does not have any rheumatological conditions.  Previously failed corticosteroid injections, last visit we did cryoneurolysis procedure of left knee which has not provided him with any significantly for pain.  His knee replacement surgeries has been postponed due to uncontrolled hypertension.  Event today his blood pressure was 216 systolic when measured first time, subsequently it was 200 systolic.  We did contact his primary care provider who recommended that he goes to the ER, I did convey that message to the patient.  As far as his knee pain is concerned he has previously failed NSAIDs, tramadol, I agreed to give him a prescription for hydrocodone to temporize his pain while he is awaiting the knee replacement.  Discussed risk associated with narcotics including but not limited to addiction, dependence, respiratory depression, death, mental health issues, hormonal changes.  I have personally reviewed the OARRS report. I have considered the risks of abuse, dependence, addiction and diversion.  opiate agreement was signed by the patient.      I spent 34 minutes in the professional and overall care of this patient.       Guillermo Ramirez MD  "

## 2024-02-05 NOTE — TELEPHONE ENCOUNTER
called patient to go over x-ray results and whats the next step   ----- Message from Guillermo Ramirez MD sent at 2/5/2024  1:29 PM EST -----  Reviewed x-ray which does reveal worsening of osteoarthritis of his left knee, follow-up with orthopedics for consideration of knee surgery.  In addition his urine does reveal presence of medical marijuana, we do not recommend usage of marijuana with opioids.

## 2024-02-06 ENCOUNTER — TELEPHONE (OUTPATIENT)
Dept: PAIN MEDICINE | Facility: CLINIC | Age: 74
End: 2024-02-06
Payer: MEDICARE

## 2024-02-08 LAB — CARBOXYTHC UR-MCNC: >500 NG/ML

## 2024-02-16 DIAGNOSIS — G89.29 CHRONIC PAIN OF LEFT KNEE: ICD-10-CM

## 2024-02-16 DIAGNOSIS — M25.562 CHRONIC PAIN OF LEFT KNEE: ICD-10-CM

## 2024-02-16 RX ORDER — HYDROCODONE BITARTRATE AND ACETAMINOPHEN 5; 325 MG/1; MG/1
1 TABLET ORAL 2 TIMES DAILY PRN
Qty: 30 TABLET | Refills: 0 | Status: SHIPPED | OUTPATIENT
Start: 2024-02-17 | End: 2024-02-20 | Stop reason: SDUPTHER

## 2024-02-20 DIAGNOSIS — G89.29 CHRONIC PAIN OF LEFT KNEE: ICD-10-CM

## 2024-02-20 DIAGNOSIS — M25.562 CHRONIC PAIN OF LEFT KNEE: ICD-10-CM

## 2024-02-20 RX ORDER — HYDROCODONE BITARTRATE AND ACETAMINOPHEN 5; 325 MG/1; MG/1
1 TABLET ORAL 2 TIMES DAILY PRN
Qty: 30 TABLET | Refills: 0 | Status: SHIPPED | OUTPATIENT
Start: 2024-02-20 | End: 2024-04-02

## 2024-02-22 ENCOUNTER — OFFICE VISIT (OUTPATIENT)
Dept: PRIMARY CARE | Facility: CLINIC | Age: 74
End: 2024-02-22
Payer: MEDICARE

## 2024-02-22 VITALS — BODY MASS INDEX: 26.43 KG/M2 | SYSTOLIC BLOOD PRESSURE: 158 MMHG | WEIGHT: 179 LBS | DIASTOLIC BLOOD PRESSURE: 86 MMHG

## 2024-02-22 DIAGNOSIS — I10 HYPERTENSION, UNSPECIFIED TYPE: Primary | ICD-10-CM

## 2024-02-22 DIAGNOSIS — I10 BENIGN ESSENTIAL HTN: ICD-10-CM

## 2024-02-22 DIAGNOSIS — M25.569 KNEE PAIN, UNSPECIFIED CHRONICITY, UNSPECIFIED LATERALITY: ICD-10-CM

## 2024-02-22 DIAGNOSIS — Z01.810 PRE-OPERATIVE CARDIOVASCULAR EXAMINATION: ICD-10-CM

## 2024-02-22 DIAGNOSIS — I10 UNCONTROLLED HYPERTENSION: ICD-10-CM

## 2024-02-22 PROCEDURE — 3079F DIAST BP 80-89 MM HG: CPT | Performed by: STUDENT IN AN ORGANIZED HEALTH CARE EDUCATION/TRAINING PROGRAM

## 2024-02-22 PROCEDURE — 99214 OFFICE O/P EST MOD 30 MIN: CPT | Performed by: STUDENT IN AN ORGANIZED HEALTH CARE EDUCATION/TRAINING PROGRAM

## 2024-02-22 PROCEDURE — 1159F MED LIST DOCD IN RCRD: CPT | Performed by: STUDENT IN AN ORGANIZED HEALTH CARE EDUCATION/TRAINING PROGRAM

## 2024-02-22 PROCEDURE — 3077F SYST BP >= 140 MM HG: CPT | Performed by: STUDENT IN AN ORGANIZED HEALTH CARE EDUCATION/TRAINING PROGRAM

## 2024-02-22 PROCEDURE — 1036F TOBACCO NON-USER: CPT | Performed by: STUDENT IN AN ORGANIZED HEALTH CARE EDUCATION/TRAINING PROGRAM

## 2024-02-22 PROCEDURE — G0439 PPPS, SUBSEQ VISIT: HCPCS | Performed by: STUDENT IN AN ORGANIZED HEALTH CARE EDUCATION/TRAINING PROGRAM

## 2024-02-22 PROCEDURE — G0444 DEPRESSION SCREEN ANNUAL: HCPCS | Performed by: STUDENT IN AN ORGANIZED HEALTH CARE EDUCATION/TRAINING PROGRAM

## 2024-02-22 PROCEDURE — 1170F FXNL STATUS ASSESSED: CPT | Performed by: STUDENT IN AN ORGANIZED HEALTH CARE EDUCATION/TRAINING PROGRAM

## 2024-02-22 PROCEDURE — 1125F AMNT PAIN NOTED PAIN PRSNT: CPT | Performed by: STUDENT IN AN ORGANIZED HEALTH CARE EDUCATION/TRAINING PROGRAM

## 2024-02-22 PROCEDURE — 1160F RVW MEDS BY RX/DR IN RCRD: CPT | Performed by: STUDENT IN AN ORGANIZED HEALTH CARE EDUCATION/TRAINING PROGRAM

## 2024-02-22 RX ORDER — AMLODIPINE BESYLATE 10 MG/1
10 TABLET ORAL DAILY
Qty: 90 TABLET | Refills: 0 | Status: SHIPPED | OUTPATIENT
Start: 2024-02-22 | End: 2024-02-22 | Stop reason: SDUPTHER

## 2024-02-22 RX ORDER — LISINOPRIL 40 MG/1
40 TABLET ORAL DAILY
Qty: 90 TABLET | Refills: 0 | Status: SHIPPED | OUTPATIENT
Start: 2024-02-22 | End: 2024-05-22

## 2024-02-22 RX ORDER — LISINOPRIL 40 MG/1
40 TABLET ORAL DAILY
Qty: 90 TABLET | Refills: 0 | Status: SHIPPED | OUTPATIENT
Start: 2024-02-22 | End: 2024-02-22 | Stop reason: SDUPTHER

## 2024-02-22 RX ORDER — AMLODIPINE BESYLATE 10 MG/1
10 TABLET ORAL DAILY
Qty: 90 TABLET | Refills: 0 | Status: SHIPPED | OUTPATIENT
Start: 2024-02-22 | End: 2024-05-22

## 2024-02-22 ASSESSMENT — ENCOUNTER SYMPTOMS
ABDOMINAL PAIN: 0
CHEST TIGHTNESS: 0
DIARRHEA: 0
CARDIOVASCULAR NEGATIVE: 1
ACTIVITY CHANGE: 0
FEVER: 0
DIZZINESS: 0
RESPIRATORY NEGATIVE: 1
PALPITATIONS: 0
ABDOMINAL DISTENTION: 0
AGITATION: 0
SHORTNESS OF BREATH: 0
GASTROINTESTINAL NEGATIVE: 1
NEUROLOGICAL NEGATIVE: 1
JOINT SWELLING: 1
PSYCHIATRIC NEGATIVE: 1
ARTHRALGIAS: 1
NAUSEA: 0
CONSTITUTIONAL NEGATIVE: 1

## 2024-02-22 ASSESSMENT — ACTIVITIES OF DAILY LIVING (ADL)
BATHING: INDEPENDENT
MANAGING_FINANCES: INDEPENDENT
TAKING_MEDICATION: INDEPENDENT
GROCERY_SHOPPING: INDEPENDENT
DRESSING: INDEPENDENT
DOING_HOUSEWORK: INDEPENDENT

## 2024-02-22 ASSESSMENT — PATIENT HEALTH QUESTIONNAIRE - PHQ9
1. LITTLE INTEREST OR PLEASURE IN DOING THINGS: NOT AT ALL
2. FEELING DOWN, DEPRESSED OR HOPELESS: NOT AT ALL
SUM OF ALL RESPONSES TO PHQ9 QUESTIONS 1 AND 2: 0

## 2024-02-22 NOTE — PROGRESS NOTES
Follow up and left leg pain (needs knee replacement) and wellness visit  \  Subjective   Reason for Visit: Sandro Florez is an 73 y.o. male here for a Medicare Wellness visit.          Reviewed all medications by prescribing practitioner or clinical pharmacist (such as prescriptions, OTCs, herbal therapies and supplements) and documented in the medical record.    Patient seen in follow-up and Medicare wellness.  States he is overall doing well however having persistent knee pain.  Does need clearance for orthopedic surgery.  Has been delayed due to his elevated blood pressure, however has been on lisinopril 40, reports he does not take his amlodipine.  Does see pain management, recently had a referral sent to the pharmacy however he has yet to pick this up.  With regards to pain management was told that likely sources of his pain will be improving on the knee replacement.  Otherwise states no additional issues or concerns.  Can tolerate greater than 3 METS without any difficulties.  Has gone surgery before with anesthesia without any issues.        Patient Care Team:  Nestor Deng DO as PCP - General (Hospitalist)  JOSELYN Hein-CNP as PCP - [employer] PCP  Marco Fitzpatrick MD as Consulting Physician (Cardiology)     Review of Systems   Constitutional: Negative.  Negative for activity change and fever.   HENT: Negative.     Respiratory: Negative.  Negative for chest tightness and shortness of breath.    Cardiovascular: Negative.  Negative for chest pain, palpitations and leg swelling.   Gastrointestinal: Negative.  Negative for abdominal distention, abdominal pain, diarrhea and nausea.   Musculoskeletal:  Positive for arthralgias and joint swelling.   Skin: Negative.    Neurological: Negative.  Negative for dizziness and syncope.   Psychiatric/Behavioral: Negative.  Negative for agitation and behavioral problems.        Objective   Vitals:  /86   Wt 81.2 kg (179 lb)   BMI  26.43 kg/m²       Physical Exam  Constitutional:       General: He is not in acute distress.     Appearance: He is not ill-appearing.   Eyes:      Pupils: Pupils are equal, round, and reactive to light.   Cardiovascular:      Rate and Rhythm: Normal rate and regular rhythm.      Pulses: Normal pulses.      Heart sounds: No murmur heard.  Pulmonary:      Effort: No respiratory distress.      Breath sounds: No wheezing.   Abdominal:      General: Abdomen is flat. Bowel sounds are normal. There is no distension.   Musculoskeletal:      Right lower leg: No edema.      Left lower leg: No edema.      Comments: Hobbling gait   Skin:     General: Skin is warm and dry.   Neurological:      Mental Status: He is alert. Mental status is at baseline.      Cranial Nerves: No cranial nerve deficit.      Motor: No weakness.   Psychiatric:         Mood and Affect: Mood normal.         Behavior: Behavior normal.         Assessment/Plan   Problem List Items Addressed This Visit    None       Patient seen on establishing care     #Hypertension  Continues to be elevated however is noncompliant with medications, recommend lisinopril 40 mg as well as amlodipine reinitiation.  He reports he has not been taking this.  Suspect that blood pressure will return to normal levels once initiated back on amlodipine and lisinopril.  Consider Cardura, in addition to this.  Medically optimized for surgical procedure     #Osteoarthritis  Significant issue long-term, follows with pain management, advised patient that he does have refill of medication today, he is overall medically optimized, medically clear for surgery with initiation of blood pressure medication as above  Advise preadmission testing  Medically optimized for orthopedic surgery    #Hyperlipidemia  Continue statin     #Stenosis of carotid artery  Follows with cardiology, continue follow-up     #Healthcare maintenance  Routine labs today  Advise age-appropriate vaccinations  Depression  screen negative  Check PSA, up-to-date on colonoscopy screening     3 to 4 months or as needed

## 2024-02-23 ENCOUNTER — HOSPITAL ENCOUNTER (OUTPATIENT)
Facility: HOSPITAL | Age: 74
Setting detail: OUTPATIENT SURGERY
End: 2024-02-23
Attending: ORTHOPAEDIC SURGERY | Admitting: ORTHOPAEDIC SURGERY
Payer: MEDICARE

## 2024-02-23 DIAGNOSIS — Z01.818 PRE-OP TESTING: Primary | ICD-10-CM

## 2024-02-23 PROBLEM — M17.12 ARTHRITIS OF LEFT KNEE: Status: ACTIVE | Noted: 2023-12-11

## 2024-03-15 ENCOUNTER — PRE-ADMISSION TESTING (OUTPATIENT)
Dept: PREADMISSION TESTING | Facility: HOSPITAL | Age: 74
End: 2024-03-15
Payer: MEDICARE

## 2024-03-15 VITALS
OXYGEN SATURATION: 98 % | WEIGHT: 176.37 LBS | HEART RATE: 68 BPM | BODY MASS INDEX: 26.12 KG/M2 | SYSTOLIC BLOOD PRESSURE: 145 MMHG | RESPIRATION RATE: 16 BRPM | DIASTOLIC BLOOD PRESSURE: 78 MMHG | TEMPERATURE: 99.1 F | HEIGHT: 69 IN

## 2024-03-15 DIAGNOSIS — Z01.818 PRE-OP TESTING: Primary | ICD-10-CM

## 2024-03-15 DIAGNOSIS — M17.12 ARTHRITIS OF LEFT KNEE: ICD-10-CM

## 2024-03-15 DIAGNOSIS — I10 UNCONTROLLED HYPERTENSION: Primary | ICD-10-CM

## 2024-03-15 LAB
APPEARANCE UR: CLEAR
BILIRUB UR STRIP.AUTO-MCNC: NEGATIVE MG/DL
COLOR UR: YELLOW
GLUCOSE UR STRIP.AUTO-MCNC: NEGATIVE MG/DL
HOLD SPECIMEN: NORMAL
KETONES UR STRIP.AUTO-MCNC: NEGATIVE MG/DL
LEUKOCYTE ESTERASE UR QL STRIP.AUTO: NEGATIVE
NITRITE UR QL STRIP.AUTO: NEGATIVE
PH UR STRIP.AUTO: 5 [PH]
PROT UR STRIP.AUTO-MCNC: NEGATIVE MG/DL
RBC # UR STRIP.AUTO: NEGATIVE /UL
SP GR UR STRIP.AUTO: 1.01
UROBILINOGEN UR STRIP.AUTO-MCNC: <2 MG/DL

## 2024-03-15 PROCEDURE — 93005 ELECTROCARDIOGRAM TRACING: CPT

## 2024-03-15 PROCEDURE — 99204 OFFICE O/P NEW MOD 45 MIN: CPT | Performed by: NURSE PRACTITIONER

## 2024-03-15 PROCEDURE — 87081 CULTURE SCREEN ONLY: CPT | Mod: PARLAB | Performed by: NURSE PRACTITIONER

## 2024-03-15 PROCEDURE — 93010 ELECTROCARDIOGRAM REPORT: CPT | Performed by: INTERNAL MEDICINE

## 2024-03-15 PROCEDURE — 81003 URINALYSIS AUTO W/O SCOPE: CPT

## 2024-03-15 RX ORDER — DOXAZOSIN 4 MG/1
TABLET ORAL
COMMUNITY
Start: 2020-09-04 | End: 2024-03-15 | Stop reason: SDUPTHER

## 2024-03-15 RX ORDER — DOXAZOSIN 4 MG/1
4 TABLET ORAL NIGHTLY
Qty: 90 TABLET | Refills: 0 | Status: SHIPPED | OUTPATIENT
Start: 2024-03-15 | End: 2024-06-13

## 2024-03-15 ASSESSMENT — DUKE ACTIVITY SCORE INDEX (DASI)
DASI METS SCORE: 5.6
CAN YOU WALK INDOORS, SUCH AS AROUND YOUR HOUSE: YES
CAN YOU DO MODERATE WORK AROUND THE HOUSE LIKE VACUUMING, SWEEPING FLOORS OR CARRYING GROCERIES: YES
CAN YOU DO YARD WORK LIKE RAKING LEAVES, WEEDING OR PUSHING A MOWER: YES
CAN YOU PARTICIPATE IN MODERATE RECREATIONAL ACTIVITIES LIKE GOLF, BOWLING, DANCING, DOUBLES TENNIS OR THROWING A BASEBALL OR FOOTBALL: NO
CAN YOU RUN A SHORT DISTANCE: NO
CAN YOU DO LIGHT WORK AROUND THE HOUSE LIKE DUSTING OR WASHING DISHES: YES
CAN YOU WALK A BLOCK OR TWO ON LEVEL GROUND: YES
CAN YOU HAVE SEXUAL RELATIONS: NO
TOTAL_SCORE: 23.45
CAN YOU TAKE CARE OF YOURSELF (EAT, DRESS, BATHE, OR USE TOILET): YES
CAN YOU DO HEAVY WORK AROUND THE HOUSE LIKE SCRUBBING FLOORS OR LIFTING AND MOVING HEAVY FURNITURE: NO
CAN YOU PARTICIPATE IN STRENOUS SPORTS LIKE SWIMMING, SINGLES TENNIS, FOOTBALL, BASKETBALL, OR SKIING: NO
CAN YOU CLIMB A FLIGHT OF STAIRS OR WALK UP A HILL: YES

## 2024-03-15 NOTE — PREPROCEDURE INSTRUCTIONS
Medication List            Accurate as of March 15, 2024  9:25 AM. Always use your most recent med list.                amLODIPine 10 mg tablet  Commonly known as: Norvasc  Take 1 tablet (10 mg) by mouth once daily.  Medication Adjustments for Surgery: Take morning of surgery with sip of water, no other fluids     DULoxetine 30 mg DR capsule  Commonly known as: Cymbalta  Take 1 capsule (30 mg) by mouth once daily for 7 days, THEN 2 capsules (60 mg) once daily. Do not crush or chew..  Start taking on: January 9, 2024  Medication Adjustments for Surgery: Other (Comment)     lisinopril 40 mg tablet  Take 1 tablet (40 mg) by mouth once daily.  Medication Adjustments for Surgery: Other (Comment)  Notes to patient: Hold morning of surgery.      rosuvastatin 40 mg tablet  Commonly known as: Crestor  TAKE 1 TABLET DAILY.  Medication Adjustments for Surgery: Other (Comment)  Notes to patient: Hold morning of surgery.           One of our staff members will call you one business day before your surgery between 12-4pm to let you know the time to arrive at the hospital. If you have not received a phone call by 2pm call [phone].     When you arrive at the hospital, go to Registration on the ground floor.   You will need a responsible adult to drive you home.     Prior to surgery date:  One (1) week prior to surgery STOP:  -Stop aspirin products. Do not take NSAIDS/ Ibuprofen, Aleve, Motrin. Okay to take Tylenol or Acetaminophen. Do not take vitamins, supplements, herbals, diet pills.   -Stop these medications: __________________________________________________________  -No alcohol for 24 hours prior to surgery.  -No smoking 24 hours prior. No Marijuana, CBD oil, or Vaping 48 hours prior to surgery.  -Enjoy a light supper the evening before surgery.    Day of Surgery:  -Nothing to eat or drink after midnight. This includes food of any kind (including hard candy, cough drops, mints and gum, coffee).   -No acrylic nails or  nail polish on at least one fingernail; no polish on toes for foot surgery.   -No body piercing or jewelry.   -Shower as directed. No deodorant, lotion, power, oils, perfume, make-up.   -Wear loose comfortable clothing to accommodate your bandages.     -Take the following medication with a very small amount of water the morning of surgery:  TAKE amlodipine AND CARDURA with of morning of surgery.     Contact you doctor if you take blood thinners to determine when to stop:    __________________________________________________________________________________    -Bring CPAP machine  -Bring as needed inhalers  -Bring urine specimen if directed  -Bring crutches/ walker if directed           NPO Instructions:    Do not eat any food after midnight the night before your surgery/procedure.    Additional Instructions:     You will be contacted regarding the time of your arrival to facility and surgery time

## 2024-03-15 NOTE — CPM/PAT H&P
CPM/PAT Evaluation       Name: Sandro Florez (Sandro Florez)  /Age: 1950/73 y.o.     In-Person       Chief Complaint:     73 yr old male with c/o knee pain.      About 1 year ago, pain got worse, along with hips, knees and hand pain d/t OA  He states he tore a mensicus with surgery 20 years ago--->moving furniture.  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  He c/o knee will buckle and is the most painful, with sharp shooting pain  He c/o swelling intermittent  He denies N/T.    Patient reports ongoing progressive pain worsened by activity including walking, standing, going up/down steps, getting up/down from chair, driving   Pain is worse in PM       He has tried various treatments including knee brace, injections and physical therapy but with no lasting relief.                                                            Today pain is a 0--->5-6 with activity, 7-8 with buckeling  He is treating pain with Hydrocodone/Acetaminophen--->stopped 2 weeks ago  He is now taking Ibuprofen last taken 3/15    States he recently went to pain management with Dr. Ramirez with following procedure:  Cryoneurolysis lesion was created -->by targeting inferiomedial genicular nerve. Iovera system was used to create a cryoneurolysis lesion targeting left superomedial, superolateral, inferiomedia genicular nerves.       Denies any cardiac or respiratory symptoms. Denies any past issues with anesthesia  Left total knee replacement is Scheduled on 3/26/2024  with Dr. Flores    Past Medical History:   Diagnosis Date    Abnormal findings on diagnostic imaging of other specified body structures     Abnormal carotid ultrasound    Other meniscus derangements, unspecified meniscus, unspecified knee 12/15/2020    Meniscus degeneration    Personal history of other diseases of the  nervous system and sense organs 01/30/2015    History of impacted cerumen    Personal history of other infectious and parasitic diseases     History of measles    Personal history of other infectious and parasitic diseases     History of varicella    Personal history of other medical treatment     History of nuclear stress test    Unspecified abdominal hernia without obstruction or gangrene     Hernia       Past Surgical History:   Procedure Laterality Date    KNEE SURGERY  01/30/2015    Knee Surgery    OTHER SURGICAL HISTORY  01/30/2015    Arthroscopy Elbow Right    OTHER SURGICAL HISTORY  12/15/2020    Hydrocele repair    OTHER SURGICAL HISTORY  12/15/2020    Carpal tunnel surgery    OTHER SURGICAL HISTORY  12/15/2020    Extensor tendon repair       Patient  has no history on file for sexual activity.    Family History   Problem Relation Name Age of Onset    No Known Problems Mother      No Known Problems Father         No Known Allergies    Prior to Admission medications    Medication Sig Start Date End Date Taking? Authorizing Provider   amLODIPine (Norvasc) 10 mg tablet Take 1 tablet (10 mg) by mouth once daily. 2/22/24 5/22/24  Nestor Deng, DO   DULoxetine (Cymbalta) 30 mg DR capsule Take 1 capsule (30 mg) by mouth once daily for 7 days, THEN 2 capsules (60 mg) once daily. Do not crush or chew.. 1/9/24 2/15/24  Guillermo Ramirez MD   lisinopril 40 mg tablet Take 1 tablet (40 mg) by mouth once daily. 2/22/24 5/22/24  Nestor Deng,    meloxicam (Mobic) 15 mg tablet Take 1 tablet (15 mg) by mouth once daily. 11/2/23   Historical Provider, MD   rosuvastatin (Crestor) 40 mg tablet TAKE 1 TABLET DAILY. 1/2/24   Carolann Anderson, APRN-CNP        Review of Systems  Constitutional: NO F, chills, or sweats  Eyes: no blurred vision or visual disturbance  ENT: + sinuses / congestion/ PND, sore throat, difficulty hearing  Cardiovascular: no chest pain, no edema, no palps and no syncope.   Respiratory: + cough d/t  PND,  EAST, no s.o.b. and no wheezing  Gastrointestinal: no abdominal pain, no N/V, no blood in stools  Genitourinary: + urinary frequency/ urgency, dribbling, nocturia, no urinary hesitancy and no feelings of urinary urgency.   Musculoskeletal: see HPI, has neck pain without use of pillow, no back pain and no myalgias.   Integumentary: no new skin lesions and no rashes.   Neurological: Difficulty walking, h/o ADHD on adderall,  no headache,  no numbness and no tingling.   Endocrine: no recent weight gain and no recent weight loss.   Hematologic/Lymphatic: no tendency for easy bruising and no swollen glands.      Physical Exam  Constitutional:       Appearance: Normal appearance.   Cardiovascular:      Rate and Rhythm: Normal rate.      Heart sounds: Murmur heard.      Systolic murmur is present with a grade of 1/6.   Pulmonary:      Effort: Pulmonary effort is normal.      Breath sounds: Normal breath sounds.   Abdominal:      General: Bowel sounds are normal.      Palpations: Abdomen is soft.   Musculoskeletal:      Right wrist: Tenderness present. Decreased range of motion.      Left wrist: Tenderness present. Decreased range of motion.      Cervical back: Normal range of motion.      Left knee: Swelling present. Decreased range of motion. Tenderness present.      Right lower leg: No edema.      Left lower leg: No edema.      Comments: Presents with bilateral wrist braces   Skin:     General: Skin is warm and dry.   Neurological:      Mental Status: He is alert and oriented to person, place, and time.          PAT AIRWAY:   Airway:     Mallampati::  II    TM distance::  <3 FB    Neck ROM::  Full   Full facial hair.   lower dentures and upper dentures      There were no vitals taken for this visit.    DASI Risk Score    No data to display       Caprini DVT Assessment    No data to display       Modified Frailty Index    No data to display       CHADS2 Stroke Risk  Current as of 5 hours ago        N/A 3 - 100%: High  Risk   2 - 3%: Medium Risk   0 - 2%: Low Risk     Last Change: N/A          This score determines the patient's risk of having a stroke if the patient has atrial fibrillation.        This score is not applicable to this patient. Components are not calculated.          Revised Cardiac Risk Index    No data to display       Apfel Simplified Score    No data to display       Risk Analysis Index Results This Encounter    No data found in the last 1 encounters.         Assessment and Plan:     Cardiovascular: HTN/ HLD/ Stenosis of carotid artery    11/17/22 Lexiscan normal St response, normal myocardial perfusion scan with LVEF= 52%--> follows [facility] / Dr. García    Left carotid stenosis > 70% on 10/27/22 carotid duplex--> follows Dr. Fitzpatrick

## 2024-03-16 LAB
ATRIAL RATE: 72 BPM
P AXIS: 47 DEGREES
P OFFSET: 171 MS
P ONSET: 127 MS
PR INTERVAL: 180 MS
Q ONSET: 217 MS
QRS COUNT: 12 BEATS
QRS DURATION: 118 MS
QT INTERVAL: 378 MS
QTC CALCULATION(BAZETT): 413 MS
QTC FREDERICIA: 401 MS
R AXIS: 54 DEGREES
T AXIS: 52 DEGREES
T OFFSET: 406 MS
VENTRICULAR RATE: 72 BPM

## 2024-03-17 LAB — STAPHYLOCOCCUS SPEC CULT: ABNORMAL

## 2024-03-18 RX ORDER — CHLORHEXIDINE GLUCONATE 40 MG/ML
SOLUTION TOPICAL DAILY
Qty: 118 ML | Refills: 0 | Status: SHIPPED | OUTPATIENT
Start: 2024-03-18 | End: 2024-03-21

## 2024-04-02 DIAGNOSIS — M25.562 CHRONIC PAIN OF LEFT KNEE: ICD-10-CM

## 2024-04-02 DIAGNOSIS — G89.29 CHRONIC PAIN OF LEFT KNEE: ICD-10-CM

## 2024-04-02 RX ORDER — HYDROCODONE BITARTRATE AND ACETAMINOPHEN 5; 325 MG/1; MG/1
TABLET ORAL
Qty: 30 TABLET | Refills: 0 | Status: SHIPPED | OUTPATIENT
Start: 2024-04-02 | End: 2024-05-23

## 2024-04-29 ENCOUNTER — APPOINTMENT (OUTPATIENT)
Dept: ORTHOPEDIC SURGERY | Facility: CLINIC | Age: 74
End: 2024-04-29
Payer: MEDICARE

## 2024-05-13 ENCOUNTER — APPOINTMENT (OUTPATIENT)
Dept: PRIMARY CARE | Facility: CLINIC | Age: 74
End: 2024-05-13

## 2024-05-13 PROCEDURE — NOSHO NO SHOW VISIT: Performed by: STUDENT IN AN ORGANIZED HEALTH CARE EDUCATION/TRAINING PROGRAM

## 2024-05-21 DIAGNOSIS — G89.29 CHRONIC PAIN OF LEFT KNEE: ICD-10-CM

## 2024-05-21 DIAGNOSIS — M25.562 CHRONIC PAIN OF LEFT KNEE: ICD-10-CM

## 2024-05-23 ENCOUNTER — OFFICE VISIT (OUTPATIENT)
Dept: OTOLARYNGOLOGY | Facility: CLINIC | Age: 74
End: 2024-05-23
Payer: MEDICARE

## 2024-05-23 VITALS
BODY MASS INDEX: 26.58 KG/M2 | DIASTOLIC BLOOD PRESSURE: 87 MMHG | SYSTOLIC BLOOD PRESSURE: 149 MMHG | HEART RATE: 77 BPM | TEMPERATURE: 97.2 F | WEIGHT: 180 LBS

## 2024-05-23 DIAGNOSIS — H93.8X2 SENSATION OF PLUGGED EAR ON LEFT SIDE: ICD-10-CM

## 2024-05-23 DIAGNOSIS — H60.392 OTHER INFECTIVE ACUTE OTITIS EXTERNA OF LEFT EAR: Primary | ICD-10-CM

## 2024-05-23 PROCEDURE — 1160F RVW MEDS BY RX/DR IN RCRD: CPT | Performed by: NURSE PRACTITIONER

## 2024-05-23 PROCEDURE — 1125F AMNT PAIN NOTED PAIN PRSNT: CPT | Performed by: NURSE PRACTITIONER

## 2024-05-23 PROCEDURE — 3079F DIAST BP 80-89 MM HG: CPT | Performed by: NURSE PRACTITIONER

## 2024-05-23 PROCEDURE — 3077F SYST BP >= 140 MM HG: CPT | Performed by: NURSE PRACTITIONER

## 2024-05-23 PROCEDURE — 99214 OFFICE O/P EST MOD 30 MIN: CPT | Performed by: NURSE PRACTITIONER

## 2024-05-23 PROCEDURE — 1159F MED LIST DOCD IN RCRD: CPT | Performed by: NURSE PRACTITIONER

## 2024-05-23 PROCEDURE — 1036F TOBACCO NON-USER: CPT | Performed by: NURSE PRACTITIONER

## 2024-05-23 RX ORDER — CIPROFLOXACIN AND DEXAMETHASONE 3; 1 MG/ML; MG/ML
SUSPENSION/ DROPS AURICULAR (OTIC)
Qty: 7.5 ML | Refills: 0 | Status: SHIPPED | OUTPATIENT
Start: 2024-05-23

## 2024-05-23 RX ORDER — HYDROCODONE BITARTRATE AND ACETAMINOPHEN 5; 325 MG/1; MG/1
1 TABLET ORAL DAILY PRN
Qty: 30 TABLET | Refills: 0 | Status: SHIPPED | OUTPATIENT
Start: 2024-05-23 | End: 2024-05-29

## 2024-05-23 RX ORDER — AMOXICILLIN AND CLAVULANATE POTASSIUM 875; 125 MG/1; MG/1
TABLET, FILM COATED ORAL
Qty: 20 TABLET | Refills: 0 | Status: SHIPPED | OUTPATIENT
Start: 2024-05-23

## 2024-05-23 RX ORDER — LISINOPRIL 20 MG/1
40 TABLET ORAL DAILY
COMMUNITY
Start: 2024-04-27

## 2024-05-23 ASSESSMENT — ENCOUNTER SYMPTOMS
OCCASIONAL FEELINGS OF UNSTEADINESS: 0
DEPRESSION: 0
LOSS OF SENSATION IN FEET: 0

## 2024-05-23 ASSESSMENT — PAIN SCALES - GENERAL: PAINLEVEL: 7

## 2024-05-23 NOTE — PROGRESS NOTES
Subjective   Patient ID: Sandro Florez is a 72 y.o. male who presents for Recurrent Otitis.    HPI  INITIAL VISIT 10/4/2023:  He has a history of left-sided ear aches. He is having pain and fullness. He feels his glands are swollen near the ear. He went to an Urgent Care a couple of months ago for this. He is having hearing loss from the ear. He tries flushing his ear himself. No ear drainage. He has bilateral tinnitus, worse on the left. No dizziness or vertigo. No previous ear surgery. No loud noise exposure. No family history of hearing loss.     Blood pressure is high. No headaches or lightheadedness.  Not currently taking his BP medications. Called pharmacy while here and he is picking it up to start after this.     10/11/2023: Patient following up for his left ear. Has been taking oral antibiotic and ear drops. No more pain. Still can't hear, it is clogged. Sometimes it will pop and he can hear. He is still having tinnitus. No drainage. No dizziness or headaches. Overall much better.     11/8/2023: Patient following up for his left ear. Ear is clogged. He is having pain in the ear. He is having drainage from the ear.     11/16/2023: Patient following up for his right ear. It is improving, no pain. It is still itching and clogged. He has been using the Clotrimazole drops. States his hearing is at about 50%.     5/23/2024: Patient here for evaluation of his left ear.  He is having left ear pain that started a few days ago.  He has not had any issues since November.  He did put some earwax drops in the ear.  He is having trouble hearing out of this ear as well.    Past Medical History:   Diagnosis Date    Abnormal findings on diagnostic imaging of other specified body structures     Abnormal carotid ultrasound    Other meniscus derangements, unspecified meniscus, unspecified knee 12/15/2020    Meniscus degeneration    Personal history of other diseases of the nervous system and sense organs 01/30/2015     History of impacted cerumen    Personal history of other infectious and parasitic diseases     History of measles    Personal history of other infectious and parasitic diseases     History of varicella    Personal history of other medical treatment     History of nuclear stress test    Unspecified abdominal hernia without obstruction or gangrene     Hernia      Past Surgical History:   Procedure Laterality Date    KNEE SURGERY  01/30/2015    Knee Surgery    OTHER SURGICAL HISTORY  01/30/2015    Arthroscopy Elbow Right    OTHER SURGICAL HISTORY  12/15/2020    Hydrocele repair    OTHER SURGICAL HISTORY  12/15/2020    Carpal tunnel surgery    OTHER SURGICAL HISTORY  12/15/2020    Extensor tendon repair      Review of Systems    Objective   Physical Exam    Right Ear: External inspection of ear with no deformity, scars, or masses. EAC is with nonoccluding cerumen.  TM is intact with no sign of infection, effusion, or retraction.  No perforation seen.     Left Ear: External inspection of ear is normal.  Ear canal is very edematous.  TM really not able to be visualized.  Mild amounts of debris were able to be removed.  I do not see evidence of fungal infection.  A wick was placed in the ear canal due to the amount of swelling.  I also administered Ciprodex drops.    Patient has swelling and tenderness inferior to the ear.    Diagnostic Results     Previous culture shows Aspergillus fumigatus.     Assessment/Plan   Left clogged ear secondary to left otitis externa  Wick was placed today in the clinic.  Ciprodex drops administered.  I recommend continuing Ciprodex twice daily.  Follow dry ear precautions.  I also prescribed Augmentin due to the enlargement of the swelling and tenderness around the ear.  He will follow-up next week.  Call the office or proceed to the ED with any new or worsening symptoms.    All questions answered to patient satisfaction.

## 2024-05-26 DIAGNOSIS — M25.562 CHRONIC PAIN OF LEFT KNEE: ICD-10-CM

## 2024-05-26 DIAGNOSIS — G89.29 CHRONIC PAIN OF LEFT KNEE: ICD-10-CM

## 2024-05-29 ENCOUNTER — OFFICE VISIT (OUTPATIENT)
Dept: OTOLARYNGOLOGY | Facility: CLINIC | Age: 74
End: 2024-05-29
Payer: MEDICARE

## 2024-05-29 VITALS — TEMPERATURE: 97.6 F | HEIGHT: 69 IN | BODY MASS INDEX: 26.96 KG/M2 | WEIGHT: 182 LBS

## 2024-05-29 DIAGNOSIS — H61.21 EXCESSIVE CERUMEN IN EAR CANAL, RIGHT: ICD-10-CM

## 2024-05-29 DIAGNOSIS — H93.8X2 SENSATION OF PLUGGED EAR ON LEFT SIDE: ICD-10-CM

## 2024-05-29 DIAGNOSIS — H60.392 OTHER INFECTIVE ACUTE OTITIS EXTERNA OF LEFT EAR: Primary | ICD-10-CM

## 2024-05-29 PROCEDURE — 1126F AMNT PAIN NOTED NONE PRSNT: CPT | Performed by: NURSE PRACTITIONER

## 2024-05-29 PROCEDURE — 99213 OFFICE O/P EST LOW 20 MIN: CPT | Performed by: NURSE PRACTITIONER

## 2024-05-29 PROCEDURE — 1036F TOBACCO NON-USER: CPT | Performed by: NURSE PRACTITIONER

## 2024-05-29 PROCEDURE — 1160F RVW MEDS BY RX/DR IN RCRD: CPT | Performed by: NURSE PRACTITIONER

## 2024-05-29 PROCEDURE — 1159F MED LIST DOCD IN RCRD: CPT | Performed by: NURSE PRACTITIONER

## 2024-05-29 RX ORDER — HYDROCODONE BITARTRATE AND ACETAMINOPHEN 5; 325 MG/1; MG/1
1 TABLET ORAL 2 TIMES DAILY PRN
Qty: 30 TABLET | Refills: 0 | Status: SHIPPED | OUTPATIENT
Start: 2024-05-29

## 2024-05-29 ASSESSMENT — ENCOUNTER SYMPTOMS
OCCASIONAL FEELINGS OF UNSTEADINESS: 0
LOSS OF SENSATION IN FEET: 0
DEPRESSION: 0

## 2024-05-29 ASSESSMENT — PAIN SCALES - GENERAL: PAINLEVEL: 0-NO PAIN

## 2024-05-29 NOTE — PROGRESS NOTES
Subjective   Patient ID: Sandro Florez is a 72 y.o. male who presents for Recurrent Otitis.    HPI  INITIAL VISIT 10/4/2023:  He has a history of left-sided ear aches. He is having pain and fullness. He feels his glands are swollen near the ear. He went to an Urgent Care a couple of months ago for this. He is having hearing loss from the ear. He tries flushing his ear himself. No ear drainage. He has bilateral tinnitus, worse on the left. No dizziness or vertigo. No previous ear surgery. No loud noise exposure. No family history of hearing loss.     Blood pressure is high. No headaches or lightheadedness.  Not currently taking his BP medications. Called pharmacy while here and he is picking it up to start after this.     10/11/2023: Patient following up for his left ear. Has been taking oral antibiotic and ear drops. No more pain. Still can't hear, it is clogged. Sometimes it will pop and he can hear. He is still having tinnitus. No drainage. No dizziness or headaches. Overall much better.     11/8/2023: Patient following up for his left ear. Ear is clogged. He is having pain in the ear. He is having drainage from the ear.     11/16/2023: Patient following up for his right ear. It is improving, no pain. It is still itching and clogged. He has been using the Clotrimazole drops. States his hearing is at about 50%.     5/23/2024: Patient here for evaluation of his left ear.  He is having left ear pain that started a few days ago.  He has not had any issues since November.  He did put some earwax drops in the ear.  He is having trouble hearing out of this ear as well.    5/29/2024: Patient following up for his left ear.  He is feeling much better.  His ear wick fell out the evening it was put in.    Past Medical History:   Diagnosis Date    Abnormal findings on diagnostic imaging of other specified body structures     Abnormal carotid ultrasound    Other meniscus derangements, unspecified meniscus, unspecified knee  12/15/2020    Meniscus degeneration    Personal history of other diseases of the nervous system and sense organs 01/30/2015    History of impacted cerumen    Personal history of other infectious and parasitic diseases     History of measles    Personal history of other infectious and parasitic diseases     History of varicella    Personal history of other medical treatment     History of nuclear stress test    Unspecified abdominal hernia without obstruction or gangrene     Hernia      Past Surgical History:   Procedure Laterality Date    KNEE SURGERY  01/30/2015    Knee Surgery    OTHER SURGICAL HISTORY  01/30/2015    Arthroscopy Elbow Right    OTHER SURGICAL HISTORY  12/15/2020    Hydrocele repair    OTHER SURGICAL HISTORY  12/15/2020    Carpal tunnel surgery    OTHER SURGICAL HISTORY  12/15/2020    Extensor tendon repair      Review of Systems    Objective   Physical Exam    Right Ear: External inspection of ear with no deformity, scars, or masses. Ear canal is with non-occluding cerumen that was removed using the microscope, suction, and alligator forceps. After removal, TM is intact with no sign of infection, effusion, or retraction.      Left Ear: External inspection of ear is normal.  Ear canal is still slightly edematous and irritated.  TM appears intact.  I did remove mild amounts of debris.    Patient still has some swelling inferior to the ear, no tenderness and it is improved.    Diagnostic Results     Previous culture shows Aspergillus fumigatus.     Assessment/Plan   Left clogged ear secondary to left otitis externa  Patient symptoms are improving.  I recommend continuing Ciprodex eardrops.  Follow dry ear precautions.  Follow-up in 2 weeks.  We will follow-up in 3 months to remove any cerumen into doing ear check.  Excessive cerumen in the right ear canal  Cerumen successfully removed.    All questions answered to patient satisfaction.

## 2024-06-12 ENCOUNTER — TELEPHONE (OUTPATIENT)
Dept: PAIN MEDICINE | Facility: CLINIC | Age: 74
End: 2024-06-12
Payer: MEDICARE

## 2024-06-12 DIAGNOSIS — G89.29 CHRONIC PAIN OF LEFT KNEE: ICD-10-CM

## 2024-06-12 DIAGNOSIS — M25.562 CHRONIC PAIN OF LEFT KNEE: ICD-10-CM

## 2024-06-12 NOTE — TELEPHONE ENCOUNTER
VICODIN 5/325 REFILL TO [facility]. NEVER PICKED UP THE SCRIPT FROM 05/29. LOV 02/02/24, FUV 06/17/24. CURRENT CONTRACTS

## 2024-06-13 RX ORDER — HYDROCODONE BITARTRATE AND ACETAMINOPHEN 5; 325 MG/1; MG/1
1 TABLET ORAL 2 TIMES DAILY PRN
Qty: 30 TABLET | Refills: 0 | Status: SHIPPED | OUTPATIENT
Start: 2024-06-13

## 2024-06-17 ENCOUNTER — OFFICE VISIT (OUTPATIENT)
Dept: PAIN MEDICINE | Facility: CLINIC | Age: 74
End: 2024-06-17
Payer: MEDICARE

## 2024-06-17 VITALS
DIASTOLIC BLOOD PRESSURE: 86 MMHG | BODY MASS INDEX: 26.66 KG/M2 | SYSTOLIC BLOOD PRESSURE: 135 MMHG | RESPIRATION RATE: 18 BRPM | HEIGHT: 69 IN | OXYGEN SATURATION: 98 % | HEART RATE: 89 BPM | WEIGHT: 180 LBS | TEMPERATURE: 98.1 F

## 2024-06-17 DIAGNOSIS — G89.29 CHRONIC PAIN OF BOTH SHOULDERS: ICD-10-CM

## 2024-06-17 DIAGNOSIS — M25.512 CHRONIC PAIN OF BOTH SHOULDERS: ICD-10-CM

## 2024-06-17 DIAGNOSIS — M17.12 ARTHRITIS OF KNEE, LEFT: ICD-10-CM

## 2024-06-17 DIAGNOSIS — Z79.891 LONG TERM (CURRENT) USE OF OPIATE ANALGESIC: Primary | ICD-10-CM

## 2024-06-17 DIAGNOSIS — M25.511 CHRONIC PAIN OF BOTH SHOULDERS: ICD-10-CM

## 2024-06-17 PROCEDURE — 1036F TOBACCO NON-USER: CPT | Performed by: ANESTHESIOLOGY

## 2024-06-17 PROCEDURE — 1159F MED LIST DOCD IN RCRD: CPT | Performed by: ANESTHESIOLOGY

## 2024-06-17 PROCEDURE — 3075F SYST BP GE 130 - 139MM HG: CPT | Performed by: ANESTHESIOLOGY

## 2024-06-17 PROCEDURE — 99214 OFFICE O/P EST MOD 30 MIN: CPT | Performed by: ANESTHESIOLOGY

## 2024-06-17 PROCEDURE — 1125F AMNT PAIN NOTED PAIN PRSNT: CPT | Performed by: ANESTHESIOLOGY

## 2024-06-17 PROCEDURE — 3079F DIAST BP 80-89 MM HG: CPT | Performed by: ANESTHESIOLOGY

## 2024-06-17 RX ORDER — OXYCODONE HYDROCHLORIDE 5 MG/1
5 TABLET ORAL 2 TIMES DAILY PRN
Qty: 14 TABLET | Refills: 0 | Status: SHIPPED | OUTPATIENT
Start: 2024-06-17 | End: 2024-06-24

## 2024-06-17 SDOH — ECONOMIC STABILITY: FOOD INSECURITY: WITHIN THE PAST 12 MONTHS, THE FOOD YOU BOUGHT JUST DIDN'T LAST AND YOU DIDN'T HAVE MONEY TO GET MORE.: NEVER TRUE

## 2024-06-17 SDOH — ECONOMIC STABILITY: FOOD INSECURITY: WITHIN THE PAST 12 MONTHS, YOU WORRIED THAT YOUR FOOD WOULD RUN OUT BEFORE YOU GOT MONEY TO BUY MORE.: NEVER TRUE

## 2024-06-17 ASSESSMENT — LIFESTYLE VARIABLES
SKIP TO QUESTIONS 9-10: 1
HOW OFTEN DO YOU HAVE A DRINK CONTAINING ALCOHOL: NEVER
AUDIT-C TOTAL SCORE: 0
HOW OFTEN DO YOU HAVE SIX OR MORE DRINKS ON ONE OCCASION: NEVER
HOW MANY STANDARD DRINKS CONTAINING ALCOHOL DO YOU HAVE ON A TYPICAL DAY: PATIENT DOES NOT DRINK

## 2024-06-17 ASSESSMENT — COLUMBIA-SUICIDE SEVERITY RATING SCALE - C-SSRS
2. HAVE YOU ACTUALLY HAD ANY THOUGHTS OF KILLING YOURSELF?: NO
6. HAVE YOU EVER DONE ANYTHING, STARTED TO DO ANYTHING, OR PREPARED TO DO ANYTHING TO END YOUR LIFE?: NO
1. IN THE PAST MONTH, HAVE YOU WISHED YOU WERE DEAD OR WISHED YOU COULD GO TO SLEEP AND NOT WAKE UP?: NO

## 2024-06-17 ASSESSMENT — ENCOUNTER SYMPTOMS
DEPRESSION: 1
CONSTIPATION: 1
LOSS OF SENSATION IN FEET: 1
OCCASIONAL FEELINGS OF UNSTEADINESS: 1
ARTHRALGIAS: 1
FEVER: 0
BLOOD IN STOOL: 0
SHORTNESS OF BREATH: 0

## 2024-06-17 ASSESSMENT — PAIN DESCRIPTION - DESCRIPTORS: DESCRIPTORS: SHOOTING;THROBBING;SPASM

## 2024-06-17 ASSESSMENT — PAIN SCALES - GENERAL
PAINLEVEL: 8
PAINLEVEL_OUTOF10: 8

## 2024-06-17 ASSESSMENT — PAIN - FUNCTIONAL ASSESSMENT: PAIN_FUNCTIONAL_ASSESSMENT: 0-10

## 2024-06-17 NOTE — PROGRESS NOTES
Chief Complain  Follow-up (Is having increased right pain since last visit./Is scheduled for knee surgery in September/Was prescribe Norco but makes tired and constipated )       History Of Present Illness  Sandro Florez is a 73 y.o. male here for left knee pain. The patient rates the pain at 7-8  on a scale from 0-10.  The patient describes pain as sharp, throbbing, spasms.  The pain is worsened by standing and walking and is alleviated by medications prescribed pain medications.  Since the last visit the pain has worsened.  The patient denies any fever, chills, weight loss, bladder/bowel incontinence.       Past Medical History  He has a past medical history of Abnormal findings on diagnostic imaging of other specified body structures, Anxiety, Arthritis, BPH (benign prostatic hyperplasia), Depression, HL (hearing loss), Hydrocele in adult, Hyperlipidemia, Hypertension, Other meniscus derangements, unspecified meniscus, unspecified knee (12/15/2020), Personal history of other diseases of the nervous system and sense organs (01/30/2015), Personal history of other infectious and parasitic diseases, Personal history of other infectious and parasitic diseases, Personal history of other medical treatment, Rotator cuff tear, Unspecified abdominal hernia without obstruction or gangrene, and Vision loss.    Surgical History  He has a past surgical history that includes Knee surgery (01/30/2015); Other surgical history (01/30/2015); Other surgical history (12/15/2020); Other surgical history (12/15/2020); Other surgical history (12/15/2020); Colonoscopy; Upper gastrointestinal endoscopy; Carpal tunnel release; and Meniscectomy.     Social History  He reports that he has quit smoking. His smoking use included cigarettes. He has never used smokeless tobacco. He reports current alcohol use of about 4.0 standard drinks of alcohol per week. He reports that he does not use drugs.    Family History  Family History   Problem  "Relation Name Age of Onset    No Known Problems Mother           at the age of 60    No Known Problems Father      Coronary artery disease Son  40         of complications        Allergies  Patient has no known allergies.    Review of Systems  Review of Systems   Constitutional:  Negative for fever.   Respiratory:  Negative for shortness of breath.    Cardiovascular:  Negative for chest pain.   Gastrointestinal:  Positive for constipation. Negative for blood in stool.   Musculoskeletal:  Positive for arthralgias.   Psychiatric/Behavioral:  Negative for suicidal ideas.         Physical Exam  Physical Exam  HENT:      Head: Normocephalic and atraumatic.   Eyes:      Extraocular Movements: Extraocular movements intact.      Pupils: Pupils are equal, round, and reactive to light.   Cardiovascular:      Rate and Rhythm: Normal rate.   Pulmonary:      Effort: Pulmonary effort is normal.   Musculoskeletal:      Cervical back: Neck supple.   Skin:     General: Skin is warm.   Neurological:      Mental Status: He is alert and oriented to person, place, and time.   Psychiatric:         Mood and Affect: Mood normal.           Last Recorded Vitals  Blood pressure 135/86, pulse 89, temperature 36.7 °C (98.1 °F), resp. rate 18, height 1.753 m (5' 9\"), weight 81.6 kg (180 lb), SpO2 98%.       Assessment/Plan     Sandro Florez is a 73 y.o. male here for follow-up of widespread polyarthralgia including left knee pain.  He does have significant osteoarthritis of his left knee, previously has failed steroid injection, cryoneurolysis procedure.  He is scheduled for replacement surgery in September.  Previously failed treatment with NSAIDs, tramadol we did put him on hydrocodone last time which does modestly help however causing him to constipated. I would Trial him on oxycodone.  No longer using marijuana products. I would Check his UDS for compliance.  Discussed risk associated with narcotics including but not limited to " addiction, dependence, respiratory depression, death, mental health issues, hormonal changes.  I have personally reviewed the OARRS report.  I have considered the risks of abuse, dependence, addiction and diversion.  opiate agreement was signed by the patient.          Guillermo Ramirez MD

## 2024-06-18 ENCOUNTER — TELEPHONE (OUTPATIENT)
Dept: OBSTETRICS AND GYNECOLOGY | Facility: CLINIC | Age: 74
End: 2024-06-18
Payer: MEDICARE

## 2024-06-24 ENCOUNTER — CLINICAL SUPPORT (OUTPATIENT)
Dept: PAIN MEDICINE | Facility: CLINIC | Age: 74
End: 2024-06-24
Payer: MEDICARE

## 2024-06-24 DIAGNOSIS — M17.12 ARTHRITIS OF KNEE, LEFT: Primary | ICD-10-CM

## 2024-06-24 DIAGNOSIS — Z79.891 LONG TERM (CURRENT) USE OF OPIATE ANALGESIC: Primary | ICD-10-CM

## 2024-06-24 PROCEDURE — 82570 ASSAY OF URINE CREATININE: CPT | Mod: 59

## 2024-06-24 PROCEDURE — 80346 BENZODIAZEPINES1-12: CPT | Mod: MUE

## 2024-06-24 RX ORDER — OXYCODONE HYDROCHLORIDE 5 MG/1
5 TABLET ORAL 2 TIMES DAILY PRN
Qty: 60 TABLET | Refills: 0 | Status: SHIPPED | OUTPATIENT
Start: 2024-06-24 | End: 2024-07-24

## 2024-06-25 LAB
AMPHETAMINES UR QL SCN: NORMAL
BARBITURATES UR QL SCN: NORMAL
BZE UR QL SCN: NORMAL
CANNABINOIDS UR QL SCN: NORMAL
CREAT UR-MCNC: 132.1 MG/DL (ref 20–370)
PCP UR QL SCN: NORMAL

## 2024-06-27 ENCOUNTER — OFFICE VISIT (OUTPATIENT)
Dept: OTOLARYNGOLOGY | Facility: CLINIC | Age: 74
End: 2024-06-27
Payer: MEDICARE

## 2024-06-27 VITALS
HEIGHT: 69 IN | WEIGHT: 178 LBS | HEART RATE: 90 BPM | OXYGEN SATURATION: 97 % | BODY MASS INDEX: 26.36 KG/M2 | TEMPERATURE: 98.3 F

## 2024-06-27 DIAGNOSIS — R68.84 JAW PAIN: ICD-10-CM

## 2024-06-27 DIAGNOSIS — J38.7 THROAT ULCER: ICD-10-CM

## 2024-06-27 DIAGNOSIS — J02.9 ACUTE PHARYNGITIS, UNSPECIFIED ETIOLOGY: Primary | ICD-10-CM

## 2024-06-27 DIAGNOSIS — R60.9 PAROTID SWELLING: ICD-10-CM

## 2024-06-27 DIAGNOSIS — H92.02 LEFT EAR PAIN: ICD-10-CM

## 2024-06-27 DIAGNOSIS — R07.0 THROAT PAIN: ICD-10-CM

## 2024-06-27 LAB
1OH-MIDAZOLAM UR CFM-MCNC: <25 NG/ML
6MAM UR CFM-MCNC: <25 NG/ML
7AMINOCLONAZEPAM UR CFM-MCNC: <25 NG/ML
A-OH ALPRAZ UR CFM-MCNC: <25 NG/ML
ALPRAZ UR CFM-MCNC: <25 NG/ML
CHLORDIAZEP UR CFM-MCNC: <25 NG/ML
CLONAZEPAM UR CFM-MCNC: <25 NG/ML
CODEINE UR CFM-MCNC: <50 NG/ML
DIAZEPAM UR CFM-MCNC: <25 NG/ML
EDDP UR CFM-MCNC: <25 NG/ML
FENTANYL UR CFM-MCNC: <2.5 NG/ML
HYDROCODONE CTO UR CFM-MCNC: 128 NG/ML
HYDROMORPHONE UR CFM-MCNC: 95 NG/ML
LORAZEPAM UR CFM-MCNC: <25 NG/ML
METHADONE UR CFM-MCNC: <25 NG/ML
MIDAZOLAM UR CFM-MCNC: <25 NG/ML
MORPHINE UR CFM-MCNC: <50 NG/ML
NORDIAZEPAM UR CFM-MCNC: <25 NG/ML
NORFENTANYL UR CFM-MCNC: <2.5 NG/ML
NORHYDROCODONE UR CFM-MCNC: 175 NG/ML
NOROXYCODONE UR CFM-MCNC: 961 NG/ML
NORTRAMADOL UR-MCNC: <50 NG/ML
OXAZEPAM UR CFM-MCNC: <25 NG/ML
OXYCODONE UR CFM-MCNC: 663 NG/ML
OXYMORPHONE UR CFM-MCNC: 803 NG/ML
TEMAZEPAM UR CFM-MCNC: <25 NG/ML
TRAMADOL UR CFM-MCNC: <50 NG/ML
ZOLPIDEM UR CFM-MCNC: <25 NG/ML
ZOLPIDEM UR-MCNC: <25 NG/ML

## 2024-06-27 PROCEDURE — 1159F MED LIST DOCD IN RCRD: CPT | Performed by: NURSE PRACTITIONER

## 2024-06-27 PROCEDURE — 1125F AMNT PAIN NOTED PAIN PRSNT: CPT | Performed by: NURSE PRACTITIONER

## 2024-06-27 PROCEDURE — 1036F TOBACCO NON-USER: CPT | Performed by: NURSE PRACTITIONER

## 2024-06-27 PROCEDURE — 99214 OFFICE O/P EST MOD 30 MIN: CPT | Performed by: NURSE PRACTITIONER

## 2024-06-27 RX ORDER — AMOXICILLIN AND CLAVULANATE POTASSIUM 875; 125 MG/1; MG/1
TABLET, FILM COATED ORAL
Qty: 20 TABLET | Refills: 0 | Status: SHIPPED | OUTPATIENT
Start: 2024-06-27

## 2024-06-27 RX ORDER — METHYLPREDNISOLONE 4 MG/1
TABLET ORAL
Qty: 21 TABLET | Refills: 0 | Status: SHIPPED | OUTPATIENT
Start: 2024-06-27 | End: 2024-07-04

## 2024-06-27 ASSESSMENT — ENCOUNTER SYMPTOMS
DEPRESSION: 0
LOSS OF SENSATION IN FEET: 0
OCCASIONAL FEELINGS OF UNSTEADINESS: 0

## 2024-06-27 ASSESSMENT — PAIN SCALES - GENERAL: PAINLEVEL: 8

## 2024-06-27 NOTE — PROGRESS NOTES
Subjective   Patient ID: Sandro Florez is a 72 y.o. male who presents for Recurrent Otitis.    HPI  INITIAL VISIT 10/4/2023:  He has a history of left-sided ear aches. He is having pain and fullness. He feels his glands are swollen near the ear. He went to an Urgent Care a couple of months ago for this. He is having hearing loss from the ear. He tries flushing his ear himself. No ear drainage. He has bilateral tinnitus, worse on the left. No dizziness or vertigo. No previous ear surgery. No loud noise exposure. No family history of hearing loss.     Blood pressure is high. No headaches or lightheadedness.  Not currently taking his BP medications. Called pharmacy while here and he is picking it up to start after this.     10/11/2023: Patient following up for his left ear. Has been taking oral antibiotic and ear drops. No more pain. Still can't hear, it is clogged. Sometimes it will pop and he can hear. He is still having tinnitus. No drainage. No dizziness or headaches. Overall much better.     11/8/2023: Patient following up for his left ear. Ear is clogged. He is having pain in the ear. He is having drainage from the ear.     11/16/2023: Patient following up for his right ear. It is improving, no pain. It is still itching and clogged. He has been using the Clotrimazole drops. States his hearing is at about 50%.     5/23/2024: Patient here for evaluation of his left ear.  He is having left ear pain that started a few days ago.  He has not had any issues since November.  He did put some earwax drops in the ear.  He is having trouble hearing out of this ear as well.    5/29/2024: Patient following up for his left ear.  He is feeling much better.  His ear wick fell out the evening it was put in.    6/27/2024: Last week his throat started hurting. Now the whole side of his head hurts including his ear. He has ulcers on his throat. No trouble swallowing food or drinks other than pain. Non-smoker.     Past Medical  History:   Diagnosis Date    Abnormal findings on diagnostic imaging of other specified body structures     Abnormal carotid ultrasound    Other meniscus derangements, unspecified meniscus, unspecified knee 12/15/2020    Meniscus degeneration    Personal history of other diseases of the nervous system and sense organs 01/30/2015    History of impacted cerumen    Personal history of other infectious and parasitic diseases     History of measles    Personal history of other infectious and parasitic diseases     History of varicella    Personal history of other medical treatment     History of nuclear stress test    Unspecified abdominal hernia without obstruction or gangrene     Hernia      Past Surgical History:   Procedure Laterality Date    KNEE SURGERY  01/30/2015    Knee Surgery    OTHER SURGICAL HISTORY  01/30/2015    Arthroscopy Elbow Right    OTHER SURGICAL HISTORY  12/15/2020    Hydrocele repair    OTHER SURGICAL HISTORY  12/15/2020    Carpal tunnel surgery    OTHER SURGICAL HISTORY  12/15/2020    Extensor tendon repair      Review of Systems    Objective   Constitutional: No fever, chills, weight loss or weight gain  General appearance: Appears well, well-nourished, well groomed. No acute distress.    Communication: Normal communication    Psychiatric: Oriented to person, place and time. Normal mood and affect.    Neurologic: Cranial nerves II-XII grossly intact and symmetric bilaterally.    Head and Face:  Head: Atraumatic with no masses, lesions or scarring.  Face: Normal symmetry. No scars or deformities.  TMJ: Normal, no trismus.    Eyes: Conjunctiva not edematous or erythematous.     Right Ear: External inspection of ear with no deformity, scars, or masses. EAC is clear.  TM is intact with no sign of infection, effusion, or retraction.  No perforation seen.     Left Ear: External inspection of ear with no deformity, scars, or masses. EAC is with mild cerumen that was removed using the microscope and  suction. There is no evidence of granulation tissue at the bony inferior portion of the external auditory canal at the bone-cartilage junction indicating malignant OE. No real inflammation of the canal today or purulent drainage. Patient is tender when speculum is inserted.  TM is intact with no sign of infection, effusion, or retraction.  No perforation seen.     Nose: External inspection of nose: No nasal lesions, lacerations or scars. Anterior rhinoscopy with limited visualization past the inferior turbinates. No tenderness on frontal or maxillary sinus palpation.    Oral Cavity/Mouth: Oral cavity and oropharynx mucosa moist and pink. At the right tonsillar pilar there are two ulcers (aphthous stomatitis). No evidence of PTA. Edentulous. Tonsils appear normal. Uvula is midline. Tongue with no masses or lesions. Tongue with good mobility. The oropharynx is clear.    Neck: Normal appearing, symmetric, trachea midline. Swelling at the left inferior parotid gland/preauricular area. No redness or warmth, but is edematous and tender. Patient also had a more prominent inferior parotid gland on the right side.     Cardiovascular: Examination of peripheral vascular system shows no clubbing or cyanosis.    Respiratory: No respiratory distress increased work of breathing. Inspection of the chest with symmetric chest expansion and normal respiratory effort.    Skin: No head and neck rashes.    Lymph nodes: No adenopathy.     Diagnostic Results     Previous culture shows Aspergillus fumigatus.     Assessment/Plan   Throat pain  Throat ulcer  Parotid swelling  Pharyngitis  Left Otalgia  Jaw pain  Reassurance given that his left ear canal actually looks good today. No evidence of fungal infection, or KANE.   Will treat with Augmentin and a  Medrol Dosepak. Patient denies history of diabetes or cardiac issues. May be having an episode of parotitis. Also may have a viral pharyngitis. Massage gland as well.  Patient will follow up  in 1-2 weeks. May obtain CT scan if not completely resolved.   Proceed to ED with any worsening symptoms.                                                                                                                                                                                                                                             All questions answered to patient satisfaction.

## 2024-07-08 ENCOUNTER — OFFICE VISIT (OUTPATIENT)
Dept: OTOLARYNGOLOGY | Facility: CLINIC | Age: 74
End: 2024-07-08
Payer: MEDICARE

## 2024-07-08 VITALS
DIASTOLIC BLOOD PRESSURE: 91 MMHG | HEART RATE: 82 BPM | HEIGHT: 69 IN | WEIGHT: 180 LBS | BODY MASS INDEX: 26.66 KG/M2 | TEMPERATURE: 98.5 F | SYSTOLIC BLOOD PRESSURE: 161 MMHG

## 2024-07-08 DIAGNOSIS — R51.9 FACIAL PAIN: Primary | ICD-10-CM

## 2024-07-08 DIAGNOSIS — R07.0 THROAT PAIN: ICD-10-CM

## 2024-07-08 DIAGNOSIS — K21.9 LARYNGOPHARYNGEAL REFLUX (LPR): ICD-10-CM

## 2024-07-08 DIAGNOSIS — G50.0 TRIGEMINAL NEURALGIA: ICD-10-CM

## 2024-07-08 PROCEDURE — 99214 OFFICE O/P EST MOD 30 MIN: CPT | Performed by: NURSE PRACTITIONER

## 2024-07-08 PROCEDURE — 31575 DIAGNOSTIC LARYNGOSCOPY: CPT | Performed by: NURSE PRACTITIONER

## 2024-07-08 PROCEDURE — 1159F MED LIST DOCD IN RCRD: CPT | Performed by: NURSE PRACTITIONER

## 2024-07-08 PROCEDURE — 3077F SYST BP >= 140 MM HG: CPT | Performed by: NURSE PRACTITIONER

## 2024-07-08 PROCEDURE — 1125F AMNT PAIN NOTED PAIN PRSNT: CPT | Performed by: NURSE PRACTITIONER

## 2024-07-08 PROCEDURE — 1036F TOBACCO NON-USER: CPT | Performed by: NURSE PRACTITIONER

## 2024-07-08 PROCEDURE — 3080F DIAST BP >= 90 MM HG: CPT | Performed by: NURSE PRACTITIONER

## 2024-07-08 RX ORDER — TAMSULOSIN HYDROCHLORIDE 0.4 MG/1
0.4 CAPSULE ORAL NIGHTLY
COMMUNITY
Start: 2024-06-28

## 2024-07-08 RX ORDER — OMEPRAZOLE 40 MG/1
CAPSULE, DELAYED RELEASE ORAL
Qty: 30 CAPSULE | Refills: 1 | Status: SHIPPED | OUTPATIENT
Start: 2024-07-08

## 2024-07-08 ASSESSMENT — PAIN SCALES - GENERAL: PAINLEVEL: 10-WORST PAIN EVER

## 2024-07-08 ASSESSMENT — ENCOUNTER SYMPTOMS
LOSS OF SENSATION IN FEET: 0
OCCASIONAL FEELINGS OF UNSTEADINESS: 0
DEPRESSION: 0

## 2024-07-08 NOTE — PATIENT INSTRUCTIONS
"- Call [phone] to schedule MRI  - Schedule with neurology. Call [phone] to schedule.  - start Omeprazole, this was sent to the pharmacy. Follow dietary/lifestyle modifications as listed below.     LARYNGOPHARYNGEAL REFLUX (LPR)   A common cause of hoarseness or voice changes is gastroesophageal reflux disease (GERD). GERD occurs when stomach acid flows back up into the esophagus (swallowing tube). When the acid reaches the throat, it is called laryngopharyngeal reflux (LPR) or silent reflux. It is called \"silent\" because up to 50% of people with LPR have no heartburn or stomach upset. This reflux can cause inflammation and swelling in the throat or in the larynx (voice box) and can result in a number of different symptoms.  · Hoarseness  · Sensation of lump in the throat (globus sensation)   · Frequent throat clearing  · Cough (may wake you up at night)  · Mucous sticking in the throat  · Low grade sore throat  · Worsening of asthma   · Worsening of sinus drainage or post nasal drip    The diagnosis of LPR as a cause of throat symptoms is usually based on classic symptoms and physical findings of inflammation in the throat (back part of the voicebox) that is assessed by performing a small in office procedure called a flexible nasopharyngoscopy. Often the patient is treated for LPR without any further testing and more significant testing is usually not needed. However, on some occasion, some additional tests may be required such as a swallowing study with barium, an endoscopic evaluation of the esophagus and stomach, or a probe that measures acidity (pH) in the throat and esophagus.  The most important treatment of LPR is lifestyle changes and dietary control. Lifestyle changes include talking with your primary healthcare provider about losing weight if you are currently overweight.  If you smoke, quit! Your primary healthcare provider will have suggestions to help you quit smoking. Avoid eating anything " for at least 3-4 hours before bedtime.  Elevate the head of the bed by using extra pillows or placing blocks under the legs of the bed to help reduce the amount of acid reaching your throat as you lay down. Avoid tight, binding. Below is a list of dietary guidelines:  · Eat smaller, more frequent meals rather than large one.   · Avoid food or liquids for 2-3 hours before lying down (no bedtime snacks!)   · Avoid or limit caffeinated products such as coffee, tea, soda, and chocolate  · Avoid or limit red sauces and salsa  · Avoid or limit fatty, fried, spicy or greasy foods  · Avoid or limit citric juices such as orange and grapefruit juice  · Avoid or limit mints such as peppermint and spearmint  · Avoid or limit alcohol   You may be prescribed anti-reflux medication.  These are most commonly the class of protein pump inhibitors such as Omeprazole, Esomeprazole, Pantoprazole, and Lansoprazole. These medications act by preventing acid production, not by neutralizing acid already present in the stomach and need to be taken 30 minutes before your biggest meal.  These medications can take up to 4 weeks to have effect, so it is important to take these consistently along with lifestyle and dietary modifications until your follow up appointment.    Untreated LPR can lead to chronic swelling of the vocal folds, ulcerations of the vocal folds and formation of masses known as granulomas.    Welcome to [facility] clinic. We are here to assist you through your ENT care at [facility].  Rebecca is a Nurse Practitioner who specializes in General ENT. This means that she specializes in taking care of patients with usual ENT issues such as nasal congestion, allergy symptoms, sinusitis, hearing loss, ear infections, ear wax removal, hoarseness, sore throat, throat infections, reflux and some swallowing issues. She also sees patients regarding dizziness and vertigo.   Tiffanie is Rebecca's  and she answers the  office phone from 7:30am-4pm Mon-Fri. Call [phone]. She can help you with scheduling of appointments, and general questions and information. You may need to leave a message if she is helping another patient. In this case, someone from the team will call you back the same day if you leave your message before 3pm, or the next business morning.  Rebecca currently sees patients at [facility] on Mondays, Wednesdays and Thursdays.  She works closely with audiologists to solve issues with hearing. She is also in very close contact with her collaborative physicians. Dr. Jarrett, a surgeon who specializes in general ENT and rhinology. She also works closely with otologist (ear surgeon) Dr. Pedroza and head and neck surgery Dr. Fitzpatrick.   Others who may be included in your care are dieticians, social workers, allergists, gastroenterologists, neurologists, and physical therapists. Rebecca will provide these referrals as needed. Please let her know if you would like to request a specific referral.  Rebecca makes every effort to run on time for your appointments. Therefore, if you are more than 15 minutes late unrelated to a scan or another appointment such as therapy or audiology, your appointment will need to be rescheduled for another day. We appreciate your understanding.   We look forward to working with you to meet your healthcare goals.

## 2024-07-08 NOTE — PROGRESS NOTES
Subjective   Patient ID: Sandro Florez is a 72 y.o. male who presents for Recurrent Otitis.    HPI  INITIAL VISIT 10/4/2023:  He has a history of left-sided ear aches. He is having pain and fullness. He feels his glands are swollen near the ear. He went to an Urgent Care a couple of months ago for this. He is having hearing loss from the ear. He tries flushing his ear himself. No ear drainage. He has bilateral tinnitus, worse on the left. No dizziness or vertigo. No previous ear surgery. No loud noise exposure. No family history of hearing loss.     Blood pressure is high. No headaches or lightheadedness.  Not currently taking his BP medications. Called pharmacy while here and he is picking it up to start after this.     10/11/2023: Patient following up for his left ear. Has been taking oral antibiotic and ear drops. No more pain. Still can't hear, it is clogged. Sometimes it will pop and he can hear. He is still having tinnitus. No drainage. No dizziness or headaches. Overall much better.     11/8/2023: Patient following up for his left ear. Ear is clogged. He is having pain in the ear. He is having drainage from the ear.     11/16/2023: Patient following up for his right ear. It is improving, no pain. It is still itching and clogged. He has been using the Clotrimazole drops. States his hearing is at about 50%.     5/23/2024: Patient here for evaluation of his left ear.  He is having left ear pain that started a few days ago.  He has not had any issues since November.  He did put some earwax drops in the ear.  He is having trouble hearing out of this ear as well.    5/29/2024: Patient following up for his left ear.  He is feeling much better.  His ear wick fell out the evening it was put in.    6/27/2024: Last week his throat started hurting. Now the whole side of his head hurts including his ear. He has ulcers on his throat. No trouble swallowing food or drinks other than pain. Non-smoker.     7/8/2024: Patient  following up. He completed oral antibiotics and Medrol Dosepak. He is having significant pain on the left side of his face, starting in his jaw an radiating up to his head and down his throat. He describes it as an electric shock, and can last a few seconds. Chewing or eating makes his symptoms worse. Having some throat pain as well, feels there are still lesions in his mouth. Hasn't been wearing the dentures as much.     Past Medical History:   Diagnosis Date    Abnormal findings on diagnostic imaging of other specified body structures     Abnormal carotid ultrasound    Other meniscus derangements, unspecified meniscus, unspecified knee 12/15/2020    Meniscus degeneration    Personal history of other diseases of the nervous system and sense organs 01/30/2015    History of impacted cerumen    Personal history of other infectious and parasitic diseases     History of measles    Personal history of other infectious and parasitic diseases     History of varicella    Personal history of other medical treatment     History of nuclear stress test    Unspecified abdominal hernia without obstruction or gangrene     Hernia      Past Surgical History:   Procedure Laterality Date    KNEE SURGERY  01/30/2015    Knee Surgery    OTHER SURGICAL HISTORY  01/30/2015    Arthroscopy Elbow Right    OTHER SURGICAL HISTORY  12/15/2020    Hydrocele repair    OTHER SURGICAL HISTORY  12/15/2020    Carpal tunnel surgery    OTHER SURGICAL HISTORY  12/15/2020    Extensor tendon repair      Review of Systems    Objective   Constitutional: No fever, chills, weight loss or weight gain  General appearance: Appears well, well-nourished, well groomed. No acute distress.    Communication: Normal communication    Psychiatric: Oriented to person, place and time. Normal mood and affect.    Neurologic: Cranial nerves II-XII grossly intact and symmetric bilaterally.    Head and Face:  Head: Atraumatic with no masses, lesions or scarring.  Face: Normal  symmetry. No scars or deformities.  TMJ: Normal, no trismus.    Eyes: Conjunctiva not edematous or erythematous.     Right Ear: External inspection of ear with no deformity, scars, or masses. EAC is clear.  TM is intact with no sign of infection, effusion, or retraction.  No perforation seen.     Left Ear: External inspection of ear with no deformity, scars, or masses. EAC is clear.  TM is intact with no sign of infection, effusion, or retraction.  No perforation seen.     Nose: External inspection of nose: No nasal lesions, lacerations or scars. Anterior rhinoscopy with limited visualization past the inferior turbinates. No tenderness on frontal or maxillary sinus palpation.    Oral Cavity/Mouth: Oral cavity and oropharynx mucosa moist and pink. At the soft palate, there are two areas where his ulcers appear to have healed. No mass or infection noted.  Edentulous. Tonsils appear normal. Uvula is midline. Tongue with no masses or lesions. Tongue with good mobility. The oropharynx is clear.    Neck: Normal appearing, symmetric, trachea midline. Bilateral parotid glands are prominent, no obvious infection or mass noted.     Cardiovascular: Examination of peripheral vascular system shows no clubbing or cyanosis.    Respiratory: No respiratory distress increased work of breathing. Inspection of the chest with symmetric chest expansion and normal respiratory effort.    Skin: No head and neck rashes.    Lymph nodes: No adenopathy.     Procedure: Diagnostic Laryngoscopy, Flexible  Indication: To visualize larynx.   Risks, benefits, alternatives, and expectations discussed with patient and he wishes to proceed.    FINDINGS:  A mixture of 4% topical Lidocaine and Oxymetazoline was administered into the nostril. The nasopharynx and oropharynx were normal without any lesions or masses visualized.  No masses or lesions were visualized at the base of tongue, vallecula, epiglottis, aryepiglottic folds, pyriform sinuses, and  lateral pharyngeal walls.  True vocal fold movement was normal no nodules or lesions. Mild post cricoid edema.   The patient's airway was widely patent with no evidence of obstruction.  Patient tolerated the procedure well, and there were no complications.     Assessment/Plan     Throat pain possibly secondary to LPR  Laryngoscopy performed. Start Omeprazole and follow dietary/lifestyle factors.   Facial pain and Jaw pain possible secondary to trigeminal neuralgia vs TMJ dysfunction.  The nature of his facial pain sounds consistent with trigeminal neuralgia. Recommend MRI brain/face to rule out any tumor causing his symptoms. Will also refer to neurology to help diagnose/manage the pain. He is currently on pain medication for his knee.   Could be secondary to TMJ dysfunction, but I would not except such severe pain that he describes as an electric shock.   I will follow up with results.  Ears look healthy today.   Proceed to ED with any worsening symptoms.                                                                                                                                                                                                                                             All questions answered to patient satisfaction.

## 2024-07-12 ENCOUNTER — APPOINTMENT (OUTPATIENT)
Dept: UROLOGY | Facility: CLINIC | Age: 74
End: 2024-07-12
Payer: MEDICARE

## 2024-07-12 ENCOUNTER — TELEPHONE (OUTPATIENT)
Dept: OTOLARYNGOLOGY | Facility: CLINIC | Age: 74
End: 2024-07-12

## 2024-07-12 NOTE — TELEPHONE ENCOUNTER
Patient had some questions regarding if his symptoms are related to TMJ.   Definitely is a differential, but typically does not cause such severe debilitating pain.  The ear pain is likely due to TMJ dysfunction. For the next 2 weeks, use warm compresses 3 times daily to the joint. Follow a soft diet, avoid gum chewing and tough meats. Wear a  at night if you tend to grind your teeth.  He will also see dentist.    Has MRIs scheduled. Will follow up with this.  Advised to continue PPI and schedule with neurology.

## 2024-07-17 ENCOUNTER — TELEPHONE (OUTPATIENT)
Dept: PAIN MEDICINE | Facility: CLINIC | Age: 74
End: 2024-07-17
Payer: MEDICARE

## 2024-07-18 NOTE — TELEPHONE ENCOUNTER
Patient says the 5 mg oxycodone is helping, but only last 3 hours, and wants to know can you increased the mg, chage it to 3x a day or us there an option for a time released one.       Patient never recived disability placard in mail, another one is getting mailed off today

## 2024-07-24 DIAGNOSIS — M17.12 ARTHRITIS OF KNEE, LEFT: ICD-10-CM

## 2024-07-24 RX ORDER — OXYCODONE HYDROCHLORIDE 5 MG/1
5 TABLET ORAL EVERY 8 HOURS PRN
Qty: 90 TABLET | Refills: 0 | Status: SHIPPED | OUTPATIENT
Start: 2024-07-24 | End: 2024-08-23

## 2024-07-24 NOTE — TELEPHONE ENCOUNTER
PER LAST CONVERSATION IT IS SUPPOSED TO BE 3X DAILY.     LOV 06/17/24, FUV 09/09/24. CURRENT CONTRACTS  
Aggressive behavior

## 2024-07-30 ENCOUNTER — HOSPITAL ENCOUNTER (OUTPATIENT)
Dept: RADIOLOGY | Facility: HOSPITAL | Age: 74
End: 2024-07-30
Payer: MEDICARE

## 2024-08-02 PROBLEM — I10 HYPERTENSION: Status: RESOLVED | Noted: 2023-10-02 | Resolved: 2024-08-02

## 2024-08-05 DIAGNOSIS — M17.12 ARTHRITIS OF KNEE, LEFT: Primary | ICD-10-CM

## 2024-08-22 DIAGNOSIS — M17.12 ARTHRITIS OF KNEE, LEFT: ICD-10-CM

## 2024-08-22 RX ORDER — OXYCODONE HYDROCHLORIDE 5 MG/1
5 TABLET ORAL EVERY 8 HOURS PRN
Qty: 90 TABLET | Refills: 0 | Status: SHIPPED | OUTPATIENT
Start: 2024-08-23 | End: 2024-09-22

## 2024-08-23 ENCOUNTER — TELEPHONE (OUTPATIENT)
Dept: PAIN MEDICINE | Facility: CLINIC | Age: 74
End: 2024-08-23
Payer: MEDICARE

## 2024-08-27 ENCOUNTER — APPOINTMENT (OUTPATIENT)
Dept: CARDIOLOGY | Facility: CLINIC | Age: 74
End: 2024-08-27
Payer: MEDICARE

## 2024-08-29 ENCOUNTER — APPOINTMENT (OUTPATIENT)
Dept: PRIMARY CARE | Facility: CLINIC | Age: 74
End: 2024-08-29
Payer: MEDICARE

## 2024-08-29 PROCEDURE — NOSHO NO SHOW VISIT: Performed by: STUDENT IN AN ORGANIZED HEALTH CARE EDUCATION/TRAINING PROGRAM

## 2024-09-09 ENCOUNTER — APPOINTMENT (OUTPATIENT)
Dept: PRIMARY CARE | Facility: CLINIC | Age: 74
End: 2024-09-09
Payer: MEDICARE

## 2024-09-09 VITALS — BODY MASS INDEX: 25.7 KG/M2 | SYSTOLIC BLOOD PRESSURE: 149 MMHG | WEIGHT: 174 LBS | DIASTOLIC BLOOD PRESSURE: 76 MMHG

## 2024-09-09 DIAGNOSIS — D64.9 ANEMIA, UNSPECIFIED TYPE: ICD-10-CM

## 2024-09-09 DIAGNOSIS — R41.3 MEMORY CHANGE: ICD-10-CM

## 2024-09-09 DIAGNOSIS — I10 PRIMARY HYPERTENSION: Primary | ICD-10-CM

## 2024-09-09 DIAGNOSIS — I10 PRIMARY HYPERTENSION: ICD-10-CM

## 2024-09-09 PROCEDURE — 1036F TOBACCO NON-USER: CPT | Performed by: STUDENT IN AN ORGANIZED HEALTH CARE EDUCATION/TRAINING PROGRAM

## 2024-09-09 PROCEDURE — 3078F DIAST BP <80 MM HG: CPT | Performed by: STUDENT IN AN ORGANIZED HEALTH CARE EDUCATION/TRAINING PROGRAM

## 2024-09-09 PROCEDURE — 1160F RVW MEDS BY RX/DR IN RCRD: CPT | Performed by: STUDENT IN AN ORGANIZED HEALTH CARE EDUCATION/TRAINING PROGRAM

## 2024-09-09 PROCEDURE — G2211 COMPLEX E/M VISIT ADD ON: HCPCS | Performed by: STUDENT IN AN ORGANIZED HEALTH CARE EDUCATION/TRAINING PROGRAM

## 2024-09-09 PROCEDURE — 1159F MED LIST DOCD IN RCRD: CPT | Performed by: STUDENT IN AN ORGANIZED HEALTH CARE EDUCATION/TRAINING PROGRAM

## 2024-09-09 PROCEDURE — 99214 OFFICE O/P EST MOD 30 MIN: CPT | Performed by: STUDENT IN AN ORGANIZED HEALTH CARE EDUCATION/TRAINING PROGRAM

## 2024-09-09 PROCEDURE — 3077F SYST BP >= 140 MM HG: CPT | Performed by: STUDENT IN AN ORGANIZED HEALTH CARE EDUCATION/TRAINING PROGRAM

## 2024-09-09 RX ORDER — LISINOPRIL 40 MG/1
40 TABLET ORAL 2 TIMES DAILY
Qty: 180 TABLET | Refills: 1 | Status: SHIPPED | OUTPATIENT
Start: 2024-09-09 | End: 2024-09-12

## 2024-09-09 RX ORDER — AMLODIPINE BESYLATE 5 MG/1
5 TABLET ORAL DAILY
Qty: 90 TABLET | Refills: 1 | Status: SHIPPED | OUTPATIENT
Start: 2024-09-09 | End: 2025-03-08

## 2024-09-09 ASSESSMENT — ENCOUNTER SYMPTOMS
MUSCULOSKELETAL NEGATIVE: 1
CARDIOVASCULAR NEGATIVE: 1
RESPIRATORY NEGATIVE: 1
GASTROINTESTINAL NEGATIVE: 1
CONSTITUTIONAL NEGATIVE: 1
PSYCHIATRIC NEGATIVE: 1
WEAKNESS: 1

## 2024-09-09 NOTE — PROGRESS NOTES
Surgical clearance    Subjective   Patient ID: Sandro Florez is a 73 y.o. male.    Patient seen for preoperative evaluation.  Regards that he is overall doing well, however his blood pressure has significantly been elevated.  Regards that he has been taking his lisinopril, however blood pressure continues to be elevated unfortunately.  Otherwise, wife does regard that memory loss has been worsening.  Otherwise, regards no acute issues or concerns.        Review of Systems   Constitutional: Negative.    HENT: Negative.     Respiratory: Negative.     Cardiovascular: Negative.    Gastrointestinal: Negative.    Musculoskeletal: Negative.    Skin: Negative.    Neurological:  Positive for weakness.   Psychiatric/Behavioral: Negative.         Objective Visit Vitals  /76   Wt 78.9 kg (174 lb)   BMI 25.70 kg/m²   Smoking Status Former   BSA 1.96 m²      Physical Exam  Constitutional:       General: He is not in acute distress.     Appearance: He is not ill-appearing.   Eyes:      Pupils: Pupils are equal, round, and reactive to light.   Cardiovascular:      Rate and Rhythm: Normal rate and regular rhythm.      Pulses: Normal pulses.      Heart sounds: No murmur heard.  Pulmonary:      Effort: No respiratory distress.      Breath sounds: No wheezing.   Abdominal:      General: Abdomen is flat. Bowel sounds are normal. There is no distension.   Musculoskeletal:      Right lower leg: No edema.      Left lower leg: No edema.   Skin:     General: Skin is warm and dry.   Neurological:      Mental Status: He is alert. Mental status is at baseline.      Cranial Nerves: No cranial nerve deficit.      Motor: No weakness.   Psychiatric:         Mood and Affect: Mood normal.         Behavior: Behavior normal.         Assessment/Plan   Diagnoses and all orders for this visit:  Primary hypertension  -     lisinopril 40 mg tablet; Take 1 tablet (40 mg) by mouth 2 times a day.  -     amLODIPine (Norvasc) 5 mg tablet; Take 1 tablet  (5 mg) by mouth once daily.  Anemia, unspecified type  -     Iron and TIBC; Future  Memory change  -     Vitamin B12; Future  -     Folate; Future  -     Iron and TIBC; Future  -     Comprehensive Metabolic Panel; Future  -     TSH with reflex to Free T4 if abnormal; Future  -     MR brain wo IV contrast; Future         Patient seen on establishing care     #Hypertension  Continues to be elevated, somewhat more compliant with his medications however will need adjustments recommend titration of lisinopril to 40 twice daily.  As well as initiation of amlodipine.  Filled out paperwork for cardiac clearance, patient is medically clear for surgery however blood pressure needs to be better controlled.  He will call if it is not elevated    #Osteoarthritis  Significant issue long-term, follows with pain management, advised patient that he does have refill of medication today, he is overall medically optimized, medically clear for surgery with initiation of blood pressure medication as above  Advise preadmission testing  Medically optimized for orthopedic surgery     #Hyperlipidemia  Continue statin     #Memory loss  Continues to be a persistent issue, recommend lab work today, MRI, neuro psychiatry referral if continues worsen    #Stenosis of carotid artery  Follows with cardiology, continue follow-up     #Healthcare maintenance  Routine labs today  Advise age-appropriate vaccinations  Depression screen negative  Check PSA, up-to-date on colonoscopy screening     3 to 4 months or as needed

## 2024-09-12 RX ORDER — LISINOPRIL 40 MG/1
40 TABLET ORAL 2 TIMES DAILY
Qty: 180 TABLET | Refills: 1 | Status: SHIPPED | OUTPATIENT
Start: 2024-09-12

## 2024-09-16 ENCOUNTER — APPOINTMENT (OUTPATIENT)
Dept: PAIN MEDICINE | Facility: CLINIC | Age: 74
End: 2024-09-16
Payer: MEDICARE

## 2024-10-24 ENCOUNTER — LAB (OUTPATIENT)
Dept: LAB | Facility: LAB | Age: 74
End: 2024-10-24
Payer: MEDICARE

## 2024-10-24 ENCOUNTER — OFFICE VISIT (OUTPATIENT)
Dept: PRIMARY CARE | Facility: CLINIC | Age: 74
End: 2024-10-24
Payer: MEDICARE

## 2024-10-24 VITALS — SYSTOLIC BLOOD PRESSURE: 130 MMHG | DIASTOLIC BLOOD PRESSURE: 74 MMHG

## 2024-10-24 DIAGNOSIS — M00.9 SEPTIC ARTHRITIS, DUE TO UNSPECIFIED ORGANISM, SEPTIC ARTHRITIS OF UNSPECIFIED LOCATION (MULTI): Primary | ICD-10-CM

## 2024-10-24 DIAGNOSIS — M00.9 SEPTIC ARTHRITIS, DUE TO UNSPECIFIED ORGANISM, SEPTIC ARTHRITIS OF UNSPECIFIED LOCATION (MULTI): ICD-10-CM

## 2024-10-24 DIAGNOSIS — D64.9 ANEMIA, UNSPECIFIED TYPE: ICD-10-CM

## 2024-10-24 DIAGNOSIS — R41.3 MEMORY CHANGE: ICD-10-CM

## 2024-10-24 LAB
ALBUMIN SERPL BCP-MCNC: 4.4 G/DL (ref 3.4–5)
ALP SERPL-CCNC: 55 U/L (ref 33–136)
ALT SERPL W P-5'-P-CCNC: 11 U/L (ref 10–52)
ANION GAP SERPL CALC-SCNC: 13 MMOL/L (ref 10–20)
AST SERPL W P-5'-P-CCNC: 15 U/L (ref 9–39)
BASOPHILS # BLD AUTO: 0.16 X10*3/UL (ref 0–0.1)
BASOPHILS NFR BLD AUTO: 1.2 %
BILIRUB SERPL-MCNC: 0.6 MG/DL (ref 0–1.2)
BUN SERPL-MCNC: 16 MG/DL (ref 6–23)
CALCIUM SERPL-MCNC: 10.1 MG/DL (ref 8.6–10.6)
CHLORIDE SERPL-SCNC: 105 MMOL/L (ref 98–107)
CO2 SERPL-SCNC: 25 MMOL/L (ref 21–32)
CREAT SERPL-MCNC: 0.81 MG/DL (ref 0.5–1.3)
CRP SERPL-MCNC: 0.38 MG/DL
EGFRCR SERPLBLD CKD-EPI 2021: >90 ML/MIN/1.73M*2
EOSINOPHIL # BLD AUTO: 1.74 X10*3/UL (ref 0–0.4)
EOSINOPHIL NFR BLD AUTO: 13.5 %
ERYTHROCYTE [DISTWIDTH] IN BLOOD BY AUTOMATED COUNT: 13.5 % (ref 11.5–14.5)
ERYTHROCYTE [SEDIMENTATION RATE] IN BLOOD BY WESTERGREN METHOD: 41 MM/H (ref 0–20)
FOLATE SERPL-MCNC: 8.6 NG/ML
GLUCOSE SERPL-MCNC: 99 MG/DL (ref 74–99)
HCT VFR BLD AUTO: 44.8 % (ref 41–52)
HGB BLD-MCNC: 14.6 G/DL (ref 13.5–17.5)
IMM GRANULOCYTES # BLD AUTO: 0.05 X10*3/UL (ref 0–0.5)
IMM GRANULOCYTES NFR BLD AUTO: 0.4 % (ref 0–0.9)
IRON SATN MFR SERPL: 17 % (ref 25–45)
IRON SERPL-MCNC: 51 UG/DL (ref 35–150)
LYMPHOCYTES # BLD AUTO: 3.3 X10*3/UL (ref 0.8–3)
LYMPHOCYTES NFR BLD AUTO: 25.6 %
MCH RBC QN AUTO: 30 PG (ref 26–34)
MCHC RBC AUTO-ENTMCNC: 32.6 G/DL (ref 32–36)
MCV RBC AUTO: 92 FL (ref 80–100)
MONOCYTES # BLD AUTO: 1.1 X10*3/UL (ref 0.05–0.8)
MONOCYTES NFR BLD AUTO: 8.5 %
NEUTROPHILS # BLD AUTO: 6.56 X10*3/UL (ref 1.6–5.5)
NEUTROPHILS NFR BLD AUTO: 50.8 %
NRBC BLD-RTO: 0 /100 WBCS (ref 0–0)
PLATELET # BLD AUTO: 343 X10*3/UL (ref 150–450)
POTASSIUM SERPL-SCNC: 3.9 MMOL/L (ref 3.5–5.3)
PROT SERPL-MCNC: 8.1 G/DL (ref 6.4–8.2)
RBC # BLD AUTO: 4.87 X10*6/UL (ref 4.5–5.9)
SODIUM SERPL-SCNC: 139 MMOL/L (ref 136–145)
TIBC SERPL-MCNC: 298 UG/DL (ref 240–445)
TSH SERPL-ACNC: 3.32 MIU/L (ref 0.44–3.98)
UIBC SERPL-MCNC: 247 UG/DL (ref 110–370)
VIT B12 SERPL-MCNC: 370 PG/ML (ref 211–911)
WBC # BLD AUTO: 12.9 X10*3/UL (ref 4.4–11.3)

## 2024-10-24 PROCEDURE — 1036F TOBACCO NON-USER: CPT | Performed by: STUDENT IN AN ORGANIZED HEALTH CARE EDUCATION/TRAINING PROGRAM

## 2024-10-24 PROCEDURE — 84443 ASSAY THYROID STIM HORMONE: CPT

## 2024-10-24 PROCEDURE — 80053 COMPREHEN METABOLIC PANEL: CPT

## 2024-10-24 PROCEDURE — 86140 C-REACTIVE PROTEIN: CPT

## 2024-10-24 PROCEDURE — 1160F RVW MEDS BY RX/DR IN RCRD: CPT | Performed by: STUDENT IN AN ORGANIZED HEALTH CARE EDUCATION/TRAINING PROGRAM

## 2024-10-24 PROCEDURE — 83540 ASSAY OF IRON: CPT

## 2024-10-24 PROCEDURE — 82746 ASSAY OF FOLIC ACID SERUM: CPT

## 2024-10-24 PROCEDURE — 83550 IRON BINDING TEST: CPT

## 2024-10-24 PROCEDURE — 85652 RBC SED RATE AUTOMATED: CPT

## 2024-10-24 PROCEDURE — G2211 COMPLEX E/M VISIT ADD ON: HCPCS | Performed by: STUDENT IN AN ORGANIZED HEALTH CARE EDUCATION/TRAINING PROGRAM

## 2024-10-24 PROCEDURE — 82607 VITAMIN B-12: CPT

## 2024-10-24 PROCEDURE — 1159F MED LIST DOCD IN RCRD: CPT | Performed by: STUDENT IN AN ORGANIZED HEALTH CARE EDUCATION/TRAINING PROGRAM

## 2024-10-24 PROCEDURE — 36415 COLL VENOUS BLD VENIPUNCTURE: CPT

## 2024-10-24 PROCEDURE — 85025 COMPLETE CBC W/AUTO DIFF WBC: CPT

## 2024-10-24 PROCEDURE — 3075F SYST BP GE 130 - 139MM HG: CPT | Performed by: STUDENT IN AN ORGANIZED HEALTH CARE EDUCATION/TRAINING PROGRAM

## 2024-10-24 PROCEDURE — 3078F DIAST BP <80 MM HG: CPT | Performed by: STUDENT IN AN ORGANIZED HEALTH CARE EDUCATION/TRAINING PROGRAM

## 2024-10-24 PROCEDURE — 99214 OFFICE O/P EST MOD 30 MIN: CPT | Performed by: STUDENT IN AN ORGANIZED HEALTH CARE EDUCATION/TRAINING PROGRAM

## 2024-10-24 RX ORDER — DOXYCYCLINE 100 MG/1
100 CAPSULE ORAL 2 TIMES DAILY
Qty: 14 CAPSULE | Refills: 0 | Status: SHIPPED | OUTPATIENT
Start: 2024-10-24 | End: 2024-10-31

## 2024-10-24 RX ORDER — DOXYCYCLINE 100 MG/1
100 CAPSULE ORAL 2 TIMES DAILY
Qty: 14 CAPSULE | Refills: 0 | Status: SHIPPED | OUTPATIENT
Start: 2024-10-24 | End: 2024-10-24

## 2024-10-24 ASSESSMENT — ENCOUNTER SYMPTOMS
GASTROINTESTINAL NEGATIVE: 1
ARTHRALGIAS: 1
CARDIOVASCULAR NEGATIVE: 1
JOINT SWELLING: 1
MYALGIAS: 1
FATIGUE: 1
WEAKNESS: 1
RESPIRATORY NEGATIVE: 1
PSYCHIATRIC NEGATIVE: 1

## 2024-10-24 NOTE — PROGRESS NOTES
Subjective   Patient ID: Sandro Florez is a 74 y.o. male who presents for Knee Pain (Left TKR 09/12, thinks it is infected).    Patient seen on sick visit.  Recently had a left total knee replacement about a month ago however over the past couple weeks has started noticing some redness and erythema around his left leg.  Does get some tenderness and warmth around his leg especially with palpitation.  Does have a follow-up with orthopedics tomorrow.  Regards that he is overall feeling well from a constitutional standpoint.  States no additional issues or concerns.  No fever chills nausea vomiting.         Review of Systems   Constitutional:  Positive for fatigue.   HENT: Negative.     Respiratory: Negative.     Cardiovascular: Negative.    Gastrointestinal: Negative.    Musculoskeletal:  Positive for arthralgias, joint swelling and myalgias.   Skin: Negative.    Neurological:  Positive for weakness.   Psychiatric/Behavioral: Negative.         Objective   /74     Physical Exam  Constitutional:       General: He is not in acute distress.     Appearance: He is not ill-appearing.   Eyes:      Pupils: Pupils are equal, round, and reactive to light.   Cardiovascular:      Rate and Rhythm: Normal rate and regular rhythm.      Pulses: Normal pulses.      Heart sounds: No murmur heard.  Pulmonary:      Effort: No respiratory distress.      Breath sounds: No wheezing.   Abdominal:      General: Abdomen is flat. Bowel sounds are normal. There is no distension.   Musculoskeletal:         General: Swelling and tenderness present.      Right lower leg: No edema.      Left lower leg: No edema.      Comments: Decreased range of motion   Skin:     General: Skin is warm and dry.   Neurological:      Mental Status: He is alert. Mental status is at baseline.      Cranial Nerves: No cranial nerve deficit.      Motor: Weakness present.   Psychiatric:         Mood and Affect: Mood normal.         Behavior: Behavior normal.          Assessment/Plan   Problem List Items Addressed This Visit    None  Visit Diagnoses         Codes    Septic arthritis, due to unspecified organism, septic arthritis of unspecified location (Multi)    -  Primary M00.9    Relevant Medications    doxycycline (Vibramycin) 100 mg capsule    Other Relevant Orders    CBC and Auto Differential    Comprehensive Metabolic Panel    Sedimentation Rate    C-Reactive Protein        Patient seen on sick visit    #Concern for septic arthritis  Will check CBC with differential, CMP ESR and CRP he does have orthopedic appointment, we have tried to get in contact with orthopedic provider in order for further guidance however was able to leave a message still awaiting callback, advised initiation of doxycycline twice daily.  Suspect that patient will get x-ray imaging at orthopedic office unfortunately they are at [facility] so no way to communicate results.  Advised alarm signs, patient will call if symptoms worsen or do not improve    Return to care as previous scheduled or as needed

## 2024-10-28 ENCOUNTER — APPOINTMENT (OUTPATIENT)
Dept: PRIMARY CARE | Facility: CLINIC | Age: 74
End: 2024-10-28
Payer: MEDICARE

## 2024-11-08 ENCOUNTER — APPOINTMENT (OUTPATIENT)
Dept: PAIN MEDICINE | Facility: CLINIC | Age: 74
End: 2024-11-08
Payer: MEDICARE

## 2025-01-28 ENCOUNTER — TELEMEDICINE (OUTPATIENT)
Dept: PRIMARY CARE | Facility: CLINIC | Age: 75
End: 2025-01-28
Payer: MEDICARE

## 2025-01-28 DIAGNOSIS — R60.9 PAROTID SWELLING: ICD-10-CM

## 2025-01-28 DIAGNOSIS — J40 BRONCHITIS: Primary | ICD-10-CM

## 2025-01-28 RX ORDER — AMOXICILLIN AND CLAVULANATE POTASSIUM 875; 125 MG/1; MG/1
TABLET, FILM COATED ORAL
Qty: 14 TABLET
Start: 2025-01-28

## 2025-01-28 NOTE — PROGRESS NOTES
Subjective   Patient ID: Sandro Florez is a 74 y.o. male who presents for coughing and sinus issues (3xdays).    HPI     Telephone visit for follow-up.  Reports 3 days of symptoms now concern for sinus infection, reports feeling out of place, has been coughing up phlegm, breathing has been a little bit heavy, denies fevers or chills, wife and daughter have been similar with symptoms which are still ongoing.  Has not felt any improvement of symptoms yet.  Has been in bed the last day and a half.  Has taken Zyrtec which he thinks led to some diarrhea.  Reports having similar bronchitis that gets every 2 to 3 years      Review of Systems    8 point review of systems is otherwise negative unless mentioned on HPI      Objective   There were no vitals taken for this visit.    Physical Exam    Telephone visit    Assessment/Plan       Bronchitis  -Continue over-the-counter cough and cold remedies  -Start course of Augmentin    RTC per previously determined interval    This note was dictated by speech recognition. Minor errors in transcription may be present.

## 2025-04-24 ENCOUNTER — OFFICE VISIT (OUTPATIENT)
Dept: PRIMARY CARE | Facility: CLINIC | Age: 75
End: 2025-04-24
Payer: MEDICARE

## 2025-04-24 VITALS
WEIGHT: 181.5 LBS | DIASTOLIC BLOOD PRESSURE: 96 MMHG | HEIGHT: 69 IN | SYSTOLIC BLOOD PRESSURE: 162 MMHG | BODY MASS INDEX: 26.88 KG/M2

## 2025-04-24 DIAGNOSIS — I10 HYPERTENSION, UNSPECIFIED TYPE: Primary | ICD-10-CM

## 2025-04-24 DIAGNOSIS — E78.5 HYPERLIPIDEMIA, UNSPECIFIED: ICD-10-CM

## 2025-04-24 DIAGNOSIS — I10 PRIMARY HYPERTENSION: ICD-10-CM

## 2025-04-24 DIAGNOSIS — N40.0 BENIGN PROSTATIC HYPERPLASIA, UNSPECIFIED WHETHER LOWER URINARY TRACT SYMPTOMS PRESENT: ICD-10-CM

## 2025-04-24 PROCEDURE — 1159F MED LIST DOCD IN RCRD: CPT | Performed by: STUDENT IN AN ORGANIZED HEALTH CARE EDUCATION/TRAINING PROGRAM

## 2025-04-24 PROCEDURE — 1160F RVW MEDS BY RX/DR IN RCRD: CPT | Performed by: STUDENT IN AN ORGANIZED HEALTH CARE EDUCATION/TRAINING PROGRAM

## 2025-04-24 PROCEDURE — G2211 COMPLEX E/M VISIT ADD ON: HCPCS | Performed by: STUDENT IN AN ORGANIZED HEALTH CARE EDUCATION/TRAINING PROGRAM

## 2025-04-24 PROCEDURE — 3080F DIAST BP >= 90 MM HG: CPT | Performed by: STUDENT IN AN ORGANIZED HEALTH CARE EDUCATION/TRAINING PROGRAM

## 2025-04-24 PROCEDURE — 1036F TOBACCO NON-USER: CPT | Performed by: STUDENT IN AN ORGANIZED HEALTH CARE EDUCATION/TRAINING PROGRAM

## 2025-04-24 PROCEDURE — 1170F FXNL STATUS ASSESSED: CPT | Performed by: STUDENT IN AN ORGANIZED HEALTH CARE EDUCATION/TRAINING PROGRAM

## 2025-04-24 PROCEDURE — 99214 OFFICE O/P EST MOD 30 MIN: CPT | Performed by: STUDENT IN AN ORGANIZED HEALTH CARE EDUCATION/TRAINING PROGRAM

## 2025-04-24 PROCEDURE — 3077F SYST BP >= 140 MM HG: CPT | Performed by: STUDENT IN AN ORGANIZED HEALTH CARE EDUCATION/TRAINING PROGRAM

## 2025-04-24 RX ORDER — LORATADINE 10 MG
10 TABLET,DISINTEGRATING ORAL DAILY
COMMUNITY

## 2025-04-24 RX ORDER — TAMSULOSIN HYDROCHLORIDE 0.4 MG/1
0.4 CAPSULE ORAL NIGHTLY
Qty: 90 CAPSULE | Refills: 1 | Status: SHIPPED | OUTPATIENT
Start: 2025-04-24 | End: 2025-10-21

## 2025-04-24 RX ORDER — AMLODIPINE BESYLATE 5 MG/1
5 TABLET ORAL DAILY
Qty: 90 TABLET | Refills: 1 | Status: SHIPPED | OUTPATIENT
Start: 2025-04-24 | End: 2025-10-21

## 2025-04-24 RX ORDER — LISINOPRIL 40 MG/1
40 TABLET ORAL DAILY
Qty: 90 TABLET | Refills: 1 | Status: SHIPPED | OUTPATIENT
Start: 2025-04-24 | End: 2025-10-21

## 2025-04-24 RX ORDER — ROSUVASTATIN CALCIUM 40 MG/1
40 TABLET, COATED ORAL DAILY
Qty: 90 TABLET | Refills: 3 | Status: SHIPPED | OUTPATIENT
Start: 2025-04-24

## 2025-04-24 ASSESSMENT — PATIENT HEALTH QUESTIONNAIRE - PHQ9
2. FEELING DOWN, DEPRESSED OR HOPELESS: NOT AT ALL
SUM OF ALL RESPONSES TO PHQ9 QUESTIONS 1 AND 2: 0
1. LITTLE INTEREST OR PLEASURE IN DOING THINGS: NOT AT ALL

## 2025-04-24 ASSESSMENT — ACTIVITIES OF DAILY LIVING (ADL)
DRESSING: INDEPENDENT
TAKING_MEDICATION: INDEPENDENT
MANAGING_FINANCES: INDEPENDENT
BATHING: INDEPENDENT
DOING_HOUSEWORK: INDEPENDENT
GROCERY_SHOPPING: INDEPENDENT

## 2025-04-24 ASSESSMENT — ENCOUNTER SYMPTOMS
PSYCHIATRIC NEGATIVE: 1
HYPERTENSION: 1
GASTROINTESTINAL NEGATIVE: 1
CARDIOVASCULAR NEGATIVE: 1
LOSS OF SENSATION IN FEET: 0
RESPIRATORY NEGATIVE: 1
CONSTITUTIONAL NEGATIVE: 1
NEUROLOGICAL NEGATIVE: 1
MUSCULOSKELETAL NEGATIVE: 1
DEPRESSION: 0
OCCASIONAL FEELINGS OF UNSTEADINESS: 0

## 2025-04-24 NOTE — PROGRESS NOTES
"Subjective   Patient ID: Sandro Florez is a 74 y.o. male.    Patient seen on follow-up and sick visit.  Recently was at his orthopedics office for preadmission evaluation for and was noted to have significantly elevated blood pressure readings.  States that s he has only been taking lisinopril daily although other medications have been prescribed in the past.  Otherwise, states overall doing well.  Denies any additional issues.    Hypertension        Review of Systems   Constitutional: Negative.    HENT: Negative.     Respiratory: Negative.     Cardiovascular: Negative.    Gastrointestinal: Negative.    Musculoskeletal: Negative.    Skin: Negative.    Neurological: Negative.    Psychiatric/Behavioral: Negative.         Objective Visit Vitals  BP (!) 162/96   Ht 1.74 m (5' 8.5\")   Wt 82.3 kg (181 lb 8 oz)   BMI 27.20 kg/m²   Smoking Status Former   BSA 1.99 m²      Physical Exam  Constitutional:       General: He is not in acute distress.     Appearance: He is not ill-appearing.   Eyes:      Pupils: Pupils are equal, round, and reactive to light.   Cardiovascular:      Rate and Rhythm: Normal rate and regular rhythm.      Pulses: Normal pulses.      Heart sounds: No murmur heard.  Pulmonary:      Effort: No respiratory distress.      Breath sounds: No wheezing.   Abdominal:      General: Abdomen is flat. Bowel sounds are normal. There is no distension.   Musculoskeletal:      Right lower leg: No edema.      Left lower leg: No edema.   Skin:     General: Skin is warm and dry.   Neurological:      Mental Status: He is alert. Mental status is at baseline.      Cranial Nerves: No cranial nerve deficit.      Motor: No weakness.   Psychiatric:         Mood and Affect: Mood normal.         Behavior: Behavior normal.         Assessment/Plan   Diagnoses and all orders for this visit:  Hypertension, unspecified type  Primary hypertension  -     lisinopril (ZestriL) 40 mg tablet; Take 1 tablet (40 mg) by mouth once daily.  -  "    amLODIPine (Norvasc) 5 mg tablet; Take 1 tablet (5 mg) by mouth once daily.  Hyperlipidemia, unspecified  -     rosuvastatin (Crestor) 40 mg tablet; Take 1 tablet (40 mg) by mouth once daily.  Benign prostatic hyperplasia, unspecified whether lower urinary tract symptoms present  -     tamsulosin (Flomax) 0.4 mg 24 hr capsule; Take 1 capsule (0.4 mg) by mouth once daily at bedtime.    Patient seen and examined at bedside.    #Hypertension urgency  Recommend lisinopril as well as amlodipine restart home Flomax, discussed medication compliance    #Hyperlipidemia  Continue statin check at next visit    Will call on Monday with blood pressure readings can titrate as appropriate he is agreeable with plan.

## 2025-06-05 ENCOUNTER — TELEPHONE (OUTPATIENT)
Dept: CARDIOLOGY | Facility: CLINIC | Age: 75
End: 2025-06-05
Payer: MEDICARE

## 2025-06-05 ENCOUNTER — APPOINTMENT (OUTPATIENT)
Dept: CARDIOLOGY | Facility: CLINIC | Age: 75
End: 2025-06-05
Payer: MEDICARE

## 2025-06-05 DIAGNOSIS — R09.89 CAROTID BRUIT, UNSPECIFIED LATERALITY: ICD-10-CM

## 2025-06-12 ENCOUNTER — HOSPITAL ENCOUNTER (OUTPATIENT)
Dept: VASCULAR MEDICINE | Facility: CLINIC | Age: 75
Discharge: HOME | End: 2025-06-12
Payer: MEDICARE

## 2025-06-12 DIAGNOSIS — R09.89 CAROTID BRUIT, UNSPECIFIED LATERALITY: ICD-10-CM

## 2025-06-12 DIAGNOSIS — I65.23 OCCLUSION AND STENOSIS OF BILATERAL CAROTID ARTERIES: ICD-10-CM

## 2025-06-12 PROCEDURE — 93880 EXTRACRANIAL BILAT STUDY: CPT | Performed by: INTERNAL MEDICINE

## 2025-06-12 PROCEDURE — 93880 EXTRACRANIAL BILAT STUDY: CPT

## 2025-06-26 NOTE — PROGRESS NOTES
Chief Complaint:   Preoperative clearance      History Of Present Illness:    Sandro Florez is a 74 y.o. male with PMHx of carotid artery stenosis, HTN, HLD, GERD, PAPA, BPH, anxiety with depression, and gout presenting today for preoperative clearance. He is planning to undergo left shoulder surgery with Dr. Hosea Raines at the [facility]. He repots he has been feeling well and denies any new cardiac symptoms since he was last seen in our office in 2023. He reports having occasional lightheadedness with change in position and occasional chest pressure/tightness occurring with activity and relieved with rest.  He denies SOB, palpitations, syncope, orthopnea, PND, lower extremity edema.      Last Recorded Vitals:  Vitals:    06/27/25 0815   BP: 164/80   BP Location: Right arm   Patient Position: Sitting   BP Cuff Size: Adult   Pulse: 63   SpO2: 98%   Weight: 83.5 kg (184 lb)     Past Medical History:  He has a past medical history of Abnormal findings on diagnostic imaging of other specified body structures, Anxiety, Arthritis, BPH (benign prostatic hyperplasia), Depression, HL (hearing loss), Hydrocele in adult, Hyperlipidemia, Hypertension, Other meniscus derangements, unspecified meniscus, unspecified knee (12/15/2020), Personal history of other diseases of the nervous system and sense organs (01/30/2015), Personal history of other infectious and parasitic diseases, Personal history of other infectious and parasitic diseases, Personal history of other medical treatment, Rotator cuff tear, Unspecified abdominal hernia without obstruction or gangrene, and Vision loss.    Past Surgical History:  He has a past surgical history that includes Knee surgery (01/30/2015); Other surgical history (01/30/2015); Other surgical history (12/15/2020); Other surgical history (12/15/2020); Other surgical history (12/15/2020); Colonoscopy; Upper gastrointestinal endoscopy; Carpal tunnel release; and Meniscectomy.       Social History:  He reports that he has quit smoking. His smoking use included cigarettes. He has never used smokeless tobacco. He reports current alcohol use of about 4.0 standard drinks of alcohol per week. He reports that he does not use drugs.    Family History:  Family History[1]     Allergies:  Patient has no known allergies.    Outpatient Medications:  Current Outpatient Medications   Medication Instructions    amLODIPine (NORVASC) 5 mg, oral, Daily    ciprofloxacin-dexamethasone (Ciprodex) otic suspension Administer 4 drops into the left ear twice daily for the next 10 days    doxazosin (CARDURA) 4 mg, oral, Nightly    lisinopril (ZESTRIL) 40 mg, oral, Daily    loratadine (CLARITIN REDITABS) 10 mg, Daily    omeprazole (PriLOSEC) 40 mg DR capsule Take daily before breakfast or 30 minutes before your biggest meal.    rosuvastatin (CRESTOR) 40 mg, oral, Daily    tamsulosin (FLOMAX) 0.4 mg, oral, Nightly     Physical Exam:  General: no acute distress  HEENT: EOMI, no scleral icterus.  Lungs: Clear to auscultation bilaterally without wheezing, rales, or rhonchi.  Cardiovascular: Regular rhythm and rate. Normal S1 and S2. No murmurs, rubs, or gallops are appreciated. JVP normal.  Abdomen: Soft, nontender, nondistended. Bowel sounds present.  Extremities: Warm and well perfused with equal 2+ pulses bilaterally.  No edema.  Neurologic: Alert and oriented x3.      Last Labs:  CBC -  Lab Results   Component Value Date    WBC 12.9 (H) 10/24/2024    HGB 14.6 10/24/2024    HCT 44.8 10/24/2024    MCV 92 10/24/2024     10/24/2024     CMP -  Lab Results   Component Value Date    CALCIUM 10.1 10/24/2024    PHOS 2.9 05/26/2023    PROT 8.1 10/24/2024    ALBUMIN 4.4 10/24/2024    AST 15 10/24/2024    ALT 11 10/24/2024    ALKPHOS 55 10/24/2024    BILITOT 0.6 10/24/2024     LIPID PANEL -   Lab Results   Component Value Date    CHOL 255 (H) 12/19/2023    TRIG 288 (H) 12/19/2023    HDL 41.4 12/19/2023    CHHDL 6.2  "12/19/2023    LDLF 101 (H) 09/30/2022    VLDL 58 (H) 12/19/2023    NHDL 214 (H) 12/19/2023     RENAL FUNCTION PANEL -   Lab Results   Component Value Date    GLUCOSE 99 10/24/2024     10/24/2024    K 3.9 10/24/2024     10/24/2024    CO2 25 10/24/2024    ANIONGAP 13 10/24/2024    BUN 16 10/24/2024    CREATININE 0.81 10/24/2024    GFRMALE >90 05/26/2023    CALCIUM 10.1 10/24/2024    PHOS 2.9 05/26/2023    ALBUMIN 4.4 10/24/2024      Lab Results   Component Value Date    BNP 14 11/15/2018    HGBA1C 5.1 12/19/2023     Last Cardiology Tests:  ECG:  EKG 6/27/2025  Sinus rhythm with PVCs, 63 bpm    Echo:  No results found for this or any previous visit from the past 1095 days.    Ejection Fractions:  No results found for: \"EF\"    Cath:  No results found for this or any previous visit from the past 1095 days.    Stress Test:  Nuclear Stress Test 11/17/2022  1. Normal stress myocardial perfusion imaging in response to  pharmacologic stress.  2. Well-maintained left ventricular function.  52 %    Cardiac Imaging:  Carotid duplex 6/12/2025  Right Carotid: <50% stenosis of the right proximal internal carotid artery. Laminar flow seen by color Doppler. Right external carotid artery appears patent with no evidence of stenosis. There is a >50% stenosis noted in the right external carotid artery. The right vertebral artery demonstrates bidirectional flow which may be suggestive of a more proximal stenosis or occlusion. No evidence of hemodynamically significant stenosis in the right subclavian artery.  Left Carotid: 50 to 69% stenosis of the left proximal ICA. Turbulent flow seen by color Doppler. ICA/CCA ratio is < 4.0. Left external carotid artery appears patent with no evidence of stenosis. The left vertebral artery is patent with antegrade flow. No evidence of hemodynamically significant stenosis in the left subclavian artery.  Comparison:  Compared with study from 11/9/2023, no significant change.    Carotid duplex " 11/9/2023  Right Carotid: <50% stenosis of the right proximal ICA.  Laminar flow seen by color Doppler. There are elevated velocities in the right ECA that are suggestive of disease. There is a >50% stenosis noted in the right external carotid artery. The right vertebral artery is patent with antegrade flow. No evidence of hemodynamically significant stenosis in the right subclavian artery.  Left Carotid: >70% stenosis of the left proximal ICA. Turbulent flow seen by color Doppler. Left external carotid artery appears patent with no evidence of stenosis. The left vertebral artery is patent with antegrade flow. No evidence of hemodynamically significant stenosis in the left subclavian artery.  Comparison:  Compared with study from 10/27/2022, left ICA velocities and ratio are higher. Remaining exam shows no significant change.    Carotid duplex 10/27/2022  Right Carotid: <50% stenosis of the right proximal ICA. Laminar flow seen by color Doppler. There are elevated velocities in the right ECA that are suggestive of disease. No evidence of hemodynamically significant stenosis of the right common carotid artery. The right vertebral artery demonstrates no flow. No evidence of hemodynamically significant stenosis in the right subclavian.  Left Carotid: >70% stenosis of the left proximal ICA. Turbulent flow seen by color Doppler. Left external carotid artery appears patent with no evidence of stenosis. No evidence of hemodynamically significant stenosis of the left common carotid artery. The left vertebral artery is patent with antegrade flow. No evidence of hemodynamically significant stenosis in the left subclavian.  Comparison:  Compared with study from 1/13/2014, no significant change.  Additional Findings:    I have personally reviewed most recent PCP, cardiology, vascular, studies and/or documentation.      Assessment/Plan   Chest tightness/pressure, patient reports having occasional chest tightness/pressure on  exertion relieved with rest.  His previous nuclear stress testing in 2022 was normal with well-maintained LV function and EF 52%.  EKG in office today showing sinus rhythm with PVCs and ventricular rate of 63 bpm. He will be scheduled to undergo nuclear stress testing.    Carotid artery stenosis, >70% stenosis of the left proximal ICA noted on carotid duplex in 2022.  Repeat carotid duplex in 2023 remained stable with greater than 70% stenosis of left ICA. Most recent carotid duplex revealed 50-69% stenosis of the left ICA (6/12/2025).  Patient reports he stopped taking rosuvastatin.  I am asking the patient to restart taking rosuvastatin 40 mg daily and start taking aspirin 81 mg daily.  Continue to surveillance with annual carotid duplex.    HTN, elevated, /80 today. He was on amlodipine 5 mg daily, doxazosin 4 mg daily, and lisinopril 40 mg daily, however reports he stopped taking all of his medications and instead has been taking some herbal supplements to help with his blood pressure.  Goal BP <130/80.  I am asking him to restart taking lisinopril 40 mg daily and amlodipine 5 mg daily.  He is to monitor his blood pressure at home and keep a log to present to his next visit.  He will also bring in his blood pressure cuff from home for us to evaluate.  Given his uncontrolled hypertension would like him to undergo echocardiogram to evaluate his heart function.    HLD, 12/19/2023 , HDL 41.4, triglycerides 288.  He is on rosuvastatin 40 mg, however reports he has stopped taking it.  Goal LDL <70 and closer to 55.  Repeat fasting blood work ordered.      Preoperative clearance, planning to undergo left shoulder surgery with Dr. Hosea Raines at the [facility].  Would like patient to undergo nuclear stress testing and echocardiogram prior to  preoperative clearance.    Follow-up with me in 1 month.    Rebecca Carrillo, APRN-CNP         [1]   Family History  Problem Relation Name Age of  Onset    No Known Problems Mother           at the age of 60    No Known Problems Father      Coronary artery disease Son  40         of complications

## 2025-06-26 NOTE — PATIENT INSTRUCTIONS
Start taking aspirin 81 mg daily.    Your BP today was 164/80. Please restart your lisinopril and amlodipine.    Fasting blood work.    Restart rosuvastatin for cholesterol.   Goal LDL <70 and closer to 55.  Your LDL was 156 on 12/19/2023.    Goal BP <130/80.  Monitor your BP at home and keep log.  Check BP 3x/week at different times of the day.  Bring this log to your next visit with me.   Bring BP cuff with you.    ECHO  Stress test     Follow-up with me in 1 month.

## 2025-06-27 ENCOUNTER — OFFICE VISIT (OUTPATIENT)
Dept: CARDIOLOGY | Facility: CLINIC | Age: 75
End: 2025-06-27
Payer: MEDICARE

## 2025-06-27 VITALS
DIASTOLIC BLOOD PRESSURE: 80 MMHG | SYSTOLIC BLOOD PRESSURE: 164 MMHG | WEIGHT: 184 LBS | HEART RATE: 63 BPM | OXYGEN SATURATION: 98 % | BODY MASS INDEX: 27.57 KG/M2

## 2025-06-27 DIAGNOSIS — I10 PRIMARY HYPERTENSION: ICD-10-CM

## 2025-06-27 DIAGNOSIS — R07.9 CHEST PAIN, UNSPECIFIED TYPE: ICD-10-CM

## 2025-06-27 DIAGNOSIS — I65.22 STENOSIS OF LEFT CAROTID ARTERY: Primary | ICD-10-CM

## 2025-06-27 DIAGNOSIS — I10 UNCONTROLLED HYPERTENSION: ICD-10-CM

## 2025-06-27 DIAGNOSIS — Z01.818 PRE-OPERATIVE CLEARANCE: ICD-10-CM

## 2025-06-27 DIAGNOSIS — R06.02 SHORTNESS OF BREATH: ICD-10-CM

## 2025-06-27 DIAGNOSIS — E78.5 HYPERLIPIDEMIA, UNSPECIFIED: ICD-10-CM

## 2025-06-27 DIAGNOSIS — R06.02 SOB (SHORTNESS OF BREATH): ICD-10-CM

## 2025-06-27 DIAGNOSIS — I10 BENIGN ESSENTIAL HTN: ICD-10-CM

## 2025-06-27 LAB
ATRIAL RATE: 63 BPM
P AXIS: 24 DEGREES
P OFFSET: 170 MS
P ONSET: 124 MS
PR INTERVAL: 194 MS
Q ONSET: 221 MS
QRS COUNT: 10 BEATS
QRS DURATION: 120 MS
QT INTERVAL: 400 MS
QTC CALCULATION(BAZETT): 409 MS
QTC FREDERICIA: 406 MS
R AXIS: 66 DEGREES
T AXIS: 95 DEGREES
T OFFSET: 421 MS
VENTRICULAR RATE: 63 BPM

## 2025-06-27 PROCEDURE — 1160F RVW MEDS BY RX/DR IN RCRD: CPT

## 2025-06-27 PROCEDURE — 3077F SYST BP >= 140 MM HG: CPT

## 2025-06-27 PROCEDURE — 93010 ELECTROCARDIOGRAM REPORT: CPT | Performed by: INTERNAL MEDICINE

## 2025-06-27 PROCEDURE — 93005 ELECTROCARDIOGRAM TRACING: CPT

## 2025-06-27 PROCEDURE — 3079F DIAST BP 80-89 MM HG: CPT

## 2025-06-27 PROCEDURE — 99212 OFFICE O/P EST SF 10 MIN: CPT

## 2025-06-27 PROCEDURE — 1036F TOBACCO NON-USER: CPT

## 2025-06-27 PROCEDURE — 1159F MED LIST DOCD IN RCRD: CPT

## 2025-06-27 PROCEDURE — 99215 OFFICE O/P EST HI 40 MIN: CPT

## 2025-06-27 RX ORDER — ROSUVASTATIN CALCIUM 40 MG/1
40 TABLET, COATED ORAL DAILY
Qty: 90 TABLET | Refills: 3 | Status: SHIPPED | OUTPATIENT
Start: 2025-06-27

## 2025-07-07 LAB
ALBUMIN SERPL-MCNC: 4.6 G/DL (ref 3.6–5.1)
ALP SERPL-CCNC: 53 U/L (ref 35–144)
ALT SERPL-CCNC: 16 U/L (ref 9–46)
ANION GAP SERPL CALCULATED.4IONS-SCNC: 10 MMOL/L (CALC) (ref 7–17)
AST SERPL-CCNC: 23 U/L (ref 10–35)
BILIRUB SERPL-MCNC: 0.7 MG/DL (ref 0.2–1.2)
BUN SERPL-MCNC: 14 MG/DL (ref 7–25)
CALCIUM SERPL-MCNC: 9.6 MG/DL (ref 8.6–10.3)
CHLORIDE SERPL-SCNC: 104 MMOL/L (ref 98–110)
CHOLEST SERPL-MCNC: 232 MG/DL
CHOLEST/HDLC SERPL: 6.8 (CALC)
CO2 SERPL-SCNC: 25 MMOL/L (ref 20–32)
CREAT SERPL-MCNC: 1.15 MG/DL (ref 0.7–1.28)
EGFRCR SERPLBLD CKD-EPI 2021: 66 ML/MIN/1.73M2
GLUCOSE SERPL-MCNC: 99 MG/DL (ref 65–99)
HDLC SERPL-MCNC: 34 MG/DL
LDLC SERPL CALC-MCNC: 152 MG/DL (CALC)
NONHDLC SERPL-MCNC: 198 MG/DL (CALC)
POTASSIUM SERPL-SCNC: 4.2 MMOL/L (ref 3.5–5.3)
PROT SERPL-MCNC: 8.4 G/DL (ref 6.1–8.1)
SODIUM SERPL-SCNC: 139 MMOL/L (ref 135–146)
TRIGL SERPL-MCNC: 282 MG/DL

## 2025-07-10 ENCOUNTER — HOSPITAL ENCOUNTER (OUTPATIENT)
Dept: CARDIOLOGY | Facility: HOSPITAL | Age: 75
Discharge: HOME | End: 2025-07-10
Payer: MEDICARE

## 2025-07-10 ENCOUNTER — HOSPITAL ENCOUNTER (OUTPATIENT)
Dept: RADIOLOGY | Facility: HOSPITAL | Age: 75
Discharge: HOME | End: 2025-07-10
Payer: MEDICARE

## 2025-07-10 DIAGNOSIS — Z01.818 PRE-OPERATIVE CLEARANCE: ICD-10-CM

## 2025-07-10 DIAGNOSIS — R07.89 OTHER CHEST PAIN: ICD-10-CM

## 2025-07-10 DIAGNOSIS — R07.9 CHEST PAIN, UNSPECIFIED TYPE: ICD-10-CM

## 2025-07-10 PROCEDURE — 93017 CV STRESS TEST TRACING ONLY: CPT

## 2025-07-10 PROCEDURE — 93016 CV STRESS TEST SUPVJ ONLY: CPT | Performed by: INTERNAL MEDICINE

## 2025-07-10 PROCEDURE — 3430000001 HC RX 343 DIAGNOSTIC RADIOPHARMACEUTICALS

## 2025-07-10 PROCEDURE — 93018 CV STRESS TEST I&R ONLY: CPT | Performed by: INTERNAL MEDICINE

## 2025-07-10 PROCEDURE — A9502 TC99M TETROFOSMIN: HCPCS

## 2025-07-10 PROCEDURE — 78452 HT MUSCLE IMAGE SPECT MULT: CPT

## 2025-07-10 RX ADMIN — TETROFOSMIN 10.1 MILLICURIE: 0.23 INJECTION, POWDER, LYOPHILIZED, FOR SOLUTION INTRAVENOUS at 10:33

## 2025-07-10 RX ADMIN — TETROFOSMIN 30.5 MILLICURIE: 0.23 INJECTION, POWDER, LYOPHILIZED, FOR SOLUTION INTRAVENOUS at 12:52

## 2025-07-11 ENCOUNTER — APPOINTMENT (OUTPATIENT)
Dept: CARDIOLOGY | Facility: CLINIC | Age: 75
End: 2025-07-11
Payer: MEDICARE

## 2025-07-14 DIAGNOSIS — R07.9 CHEST PAIN, UNSPECIFIED TYPE: ICD-10-CM

## 2025-07-14 DIAGNOSIS — R94.39 ABNORMAL STRESS TEST: Primary | ICD-10-CM

## 2025-07-14 DIAGNOSIS — E78.2 MIXED HYPERLIPIDEMIA: ICD-10-CM

## 2025-07-14 RX ORDER — METOPROLOL TARTRATE 50 MG/1
50 TABLET ORAL ONCE
Qty: 1 TABLET | Refills: 0 | Status: SHIPPED | OUTPATIENT
Start: 2025-07-14 | End: 2025-07-14

## 2025-07-15 ENCOUNTER — TELEPHONE (OUTPATIENT)
Dept: CARDIOLOGY | Facility: CLINIC | Age: 75
End: 2025-07-15
Payer: MEDICARE

## 2025-07-15 NOTE — TELEPHONE ENCOUNTER
----- Message from Rebecca Carrillo sent at 7/14/2025  4:57 PM EDT -----  Please call the patient regarding his abnormal result.    Please let patient know that his stress testing came back abnormal.   He will need to undergo CT coronary with FFR for further evaluation prior to his next follow up with me.  He is to take metoprolol 50 mg 1 hour prior to CT scan.  Labs 1 week prior to CT scan.  Also would like him to start taking aspirin 81 mg daily and restart taking his rosuvastatin if he has not yet restarted.    Orders in.    Thank you,  Edwige    ----- Message -----  From: Interface, [email or web address] - Cardiology Results In  Sent: 7/13/2025   4:52 PM EDT  To: JOSELYN Zapata-CNP

## 2025-07-16 NOTE — TELEPHONE ENCOUNTER
Patient is aware.  He has already picked up his Metoprolol.  He will call and schedule CT Scan.  If he cannot get testing done before follow up, he will call office back.

## 2025-07-17 ENCOUNTER — HOSPITAL ENCOUNTER (OUTPATIENT)
Dept: CARDIOLOGY | Facility: CLINIC | Age: 75
Discharge: HOME | End: 2025-07-17
Payer: MEDICARE

## 2025-07-17 DIAGNOSIS — I10 UNCONTROLLED HYPERTENSION: ICD-10-CM

## 2025-07-17 DIAGNOSIS — R06.02 SOB (SHORTNESS OF BREATH): ICD-10-CM

## 2025-07-17 LAB
AORTIC VALVE MEAN GRADIENT: 22 MMHG
AORTIC VALVE PEAK VELOCITY: 3.26 M/S
AV PEAK GRADIENT: 43 MMHG
AVA (PEAK VEL): 1.13 CM2
AVA (VTI): 1.25 CM2
EJECTION FRACTION APICAL 4 CHAMBER: 68.3
EJECTION FRACTION: 69 %
LEFT ATRIUM VOLUME AREA LENGTH INDEX BSA: 24.4 ML/M2
LEFT VENTRICLE INTERNAL DIMENSION DIASTOLE: 4.73 CM (ref 3.5–6)
LEFT VENTRICULAR OUTFLOW TRACT DIAMETER: 2.2 CM
MITRAL VALVE E/A RATIO: 0.66
RIGHT VENTRICLE FREE WALL PEAK S': 0.11 CM/S
TRICUSPID ANNULAR PLANE SYSTOLIC EXCURSION: 2 CM

## 2025-07-17 PROCEDURE — 93306 TTE W/DOPPLER COMPLETE: CPT

## 2025-07-17 PROCEDURE — 93306 TTE W/DOPPLER COMPLETE: CPT | Performed by: STUDENT IN AN ORGANIZED HEALTH CARE EDUCATION/TRAINING PROGRAM

## 2025-08-04 ENCOUNTER — APPOINTMENT (OUTPATIENT)
Dept: CARDIOLOGY | Facility: CLINIC | Age: 75
End: 2025-08-04
Payer: MEDICARE

## 2025-08-25 LAB
ANION GAP SERPL CALCULATED.4IONS-SCNC: 8 MMOL/L (CALC) (ref 7–17)
BUN SERPL-MCNC: 14 MG/DL (ref 7–25)
BUN/CREAT SERPL: NORMAL (CALC) (ref 6–22)
CALCIUM SERPL-MCNC: 9.2 MG/DL (ref 8.6–10.3)
CHLORIDE SERPL-SCNC: 107 MMOL/L (ref 98–110)
CO2 SERPL-SCNC: 26 MMOL/L (ref 20–32)
CREAT SERPL-MCNC: 0.96 MG/DL (ref 0.7–1.28)
EGFRCR SERPLBLD CKD-EPI 2021: 82 ML/MIN/1.73M2
GLUCOSE SERPL-MCNC: 89 MG/DL (ref 65–99)
POTASSIUM SERPL-SCNC: 4 MMOL/L (ref 3.5–5.3)
SODIUM SERPL-SCNC: 141 MMOL/L (ref 135–146)

## 2025-08-26 ENCOUNTER — HOSPITAL ENCOUNTER (OUTPATIENT)
Dept: RADIOLOGY | Facility: HOSPITAL | Age: 75
Discharge: HOME | End: 2025-08-26
Payer: MEDICARE

## 2025-08-26 VITALS
SYSTOLIC BLOOD PRESSURE: 123 MMHG | HEART RATE: 60 BPM | RESPIRATION RATE: 16 BRPM | OXYGEN SATURATION: 97 % | DIASTOLIC BLOOD PRESSURE: 46 MMHG

## 2025-08-26 DIAGNOSIS — R07.89 CHEST DISCOMFORT: Primary | ICD-10-CM

## 2025-08-26 DIAGNOSIS — E78.2 MIXED HYPERLIPIDEMIA: ICD-10-CM

## 2025-08-26 DIAGNOSIS — R93.1 ABNORMAL FINDINGS ON DIAGNOSTIC IMAGING OF HEART AND CORONARY CIRCULATION: ICD-10-CM

## 2025-08-26 DIAGNOSIS — R94.39 ABNORMAL STRESS TEST: ICD-10-CM

## 2025-08-26 DIAGNOSIS — R07.9 CHEST PAIN: ICD-10-CM

## 2025-08-26 DIAGNOSIS — R07.9 CHEST PAIN, UNSPECIFIED TYPE: ICD-10-CM

## 2025-08-26 PROCEDURE — 75574 CT ANGIO HRT W/3D IMAGE: CPT

## 2025-08-26 PROCEDURE — 75574 CT ANGIO HRT W/3D IMAGE: CPT | Performed by: RADIOLOGY

## 2025-08-26 PROCEDURE — 75571 CT HRT W/O DYE W/CA TEST: CPT

## 2025-08-26 PROCEDURE — 75580 N-INVAS EST C FFR SW ALY CTA: CPT

## 2025-08-26 PROCEDURE — 2500000001 HC RX 250 WO HCPCS SELF ADMINISTERED DRUGS (ALT 637 FOR MEDICARE OP): Performed by: RADIOLOGY

## 2025-08-26 PROCEDURE — 2550000001 HC RX 255 CONTRASTS

## 2025-08-26 RX ORDER — NITROGLYCERIN 0.4 MG/1
0.8 TABLET SUBLINGUAL ONCE
Status: COMPLETED | OUTPATIENT
Start: 2025-08-26 | End: 2025-08-26

## 2025-08-26 RX ORDER — METOPROLOL TARTRATE 1 MG/ML
5 INJECTION, SOLUTION INTRAVENOUS
Status: ACTIVE | OUTPATIENT
Start: 2025-08-26 | End: 2025-08-26

## 2025-08-26 RX ADMIN — NITROGLYCERIN 0.8 MG: 0.4 TABLET SUBLINGUAL at 14:36

## 2025-08-26 RX ADMIN — IOHEXOL 90 ML: 350 INJECTION, SOLUTION INTRAVENOUS at 14:59

## 2025-08-26 ASSESSMENT — PAIN - FUNCTIONAL ASSESSMENT
PAIN_FUNCTIONAL_ASSESSMENT: 0-10

## 2025-08-26 ASSESSMENT — PAIN SCALES - GENERAL
PAINLEVEL_OUTOF10: 0 - NO PAIN

## 2025-09-02 ENCOUNTER — OFFICE VISIT (OUTPATIENT)
Dept: CARDIOLOGY | Facility: CLINIC | Age: 75
End: 2025-09-02
Payer: MEDICARE

## 2025-09-02 VITALS
DIASTOLIC BLOOD PRESSURE: 72 MMHG | WEIGHT: 186 LBS | OXYGEN SATURATION: 96 % | HEART RATE: 79 BPM | HEIGHT: 68 IN | BODY MASS INDEX: 28.19 KG/M2 | SYSTOLIC BLOOD PRESSURE: 160 MMHG

## 2025-09-02 DIAGNOSIS — R94.39 ABNORMAL NUCLEAR STRESS TEST: ICD-10-CM

## 2025-09-02 DIAGNOSIS — I65.22 STENOSIS OF LEFT CAROTID ARTERY: ICD-10-CM

## 2025-09-02 DIAGNOSIS — R07.9 CHEST PAIN, UNSPECIFIED TYPE: ICD-10-CM

## 2025-09-02 DIAGNOSIS — I10 PRIMARY HYPERTENSION: ICD-10-CM

## 2025-09-02 DIAGNOSIS — R06.02 SOB (SHORTNESS OF BREATH): Primary | ICD-10-CM

## 2025-09-02 DIAGNOSIS — R93.1 ABNORMAL COMPUTED TOMOGRAPHY ANGIOGRAPHY OF HEART: ICD-10-CM

## 2025-09-02 DIAGNOSIS — R94.39 ABNORMAL STRESS TEST: ICD-10-CM

## 2025-09-02 DIAGNOSIS — I10 UNCONTROLLED HYPERTENSION: ICD-10-CM

## 2025-09-02 DIAGNOSIS — E78.2 MIXED HYPERLIPIDEMIA: ICD-10-CM

## 2025-09-02 PROCEDURE — 3078F DIAST BP <80 MM HG: CPT

## 2025-09-02 PROCEDURE — 3077F SYST BP >= 140 MM HG: CPT

## 2025-09-02 PROCEDURE — 1160F RVW MEDS BY RX/DR IN RCRD: CPT

## 2025-09-02 PROCEDURE — 1159F MED LIST DOCD IN RCRD: CPT

## 2025-09-02 PROCEDURE — 99212 OFFICE O/P EST SF 10 MIN: CPT

## 2025-09-02 PROCEDURE — 1036F TOBACCO NON-USER: CPT

## 2025-09-02 PROCEDURE — 99214 OFFICE O/P EST MOD 30 MIN: CPT

## 2025-09-02 RX ORDER — OXYCODONE AND ACETAMINOPHEN 5; 325 MG/1; MG/1
1 TABLET ORAL EVERY 6 HOURS PRN
COMMUNITY
Start: 2025-08-20

## 2025-09-02 RX ORDER — NAPROXEN SODIUM 220 MG/1
81 TABLET, FILM COATED ORAL DAILY
COMMUNITY

## 2025-09-02 RX ORDER — DOXAZOSIN 4 MG/1
4 TABLET ORAL NIGHTLY
Qty: 90 TABLET | Refills: 3 | Status: SHIPPED | OUTPATIENT
Start: 2025-09-02 | End: 2026-09-02